# Patient Record
Sex: MALE | Race: WHITE | NOT HISPANIC OR LATINO | Employment: OTHER | URBAN - METROPOLITAN AREA
[De-identification: names, ages, dates, MRNs, and addresses within clinical notes are randomized per-mention and may not be internally consistent; named-entity substitution may affect disease eponyms.]

---

## 2021-07-11 ENCOUNTER — APPOINTMENT (OUTPATIENT)
Dept: RADIOLOGY | Facility: MEDICAL CENTER | Age: 66
DRG: 872 | End: 2021-07-11
Attending: STUDENT IN AN ORGANIZED HEALTH CARE EDUCATION/TRAINING PROGRAM
Payer: MEDICARE

## 2021-07-11 ENCOUNTER — HOSPITAL ENCOUNTER (INPATIENT)
Facility: MEDICAL CENTER | Age: 66
LOS: 2 days | DRG: 872 | End: 2021-07-13
Attending: INTERNAL MEDICINE | Admitting: STUDENT IN AN ORGANIZED HEALTH CARE EDUCATION/TRAINING PROGRAM
Payer: MEDICARE

## 2021-07-11 PROBLEM — K40.90 UNILATERAL INGUINAL HERNIA: Status: ACTIVE | Noted: 2021-07-11

## 2021-07-11 PROBLEM — G35 MULTIPLE SCLEROSIS (HCC): Status: ACTIVE | Noted: 2021-07-11

## 2021-07-11 PROBLEM — A41.9 SEPSIS SECONDARY TO UTI (HCC): Status: ACTIVE | Noted: 2021-07-11

## 2021-07-11 PROBLEM — N39.0 SEPSIS SECONDARY TO UTI (HCC): Status: ACTIVE | Noted: 2021-07-11

## 2021-07-11 PROBLEM — F17.200 TOBACCO DEPENDENCE: Status: ACTIVE | Noted: 2021-07-11

## 2021-07-11 LAB
ANION GAP SERPL CALC-SCNC: 12 MMOL/L (ref 7–16)
APPEARANCE UR: CLEAR
BACTERIA #/AREA URNS HPF: NEGATIVE /HPF
BASOPHILS # BLD AUTO: 0.4 % (ref 0–1.8)
BASOPHILS # BLD: 0.07 K/UL (ref 0–0.12)
BILIRUB UR QL STRIP.AUTO: NEGATIVE
BUN SERPL-MCNC: 11 MG/DL (ref 8–22)
CALCIUM SERPL-MCNC: 8.9 MG/DL (ref 8.5–10.5)
CHLORIDE SERPL-SCNC: 99 MMOL/L (ref 96–112)
CO2 SERPL-SCNC: 23 MMOL/L (ref 20–33)
COLOR UR: YELLOW
CREAT SERPL-MCNC: 0.61 MG/DL (ref 0.5–1.4)
EOSINOPHIL # BLD AUTO: 0.14 K/UL (ref 0–0.51)
EOSINOPHIL NFR BLD: 0.7 % (ref 0–6.9)
EPI CELLS #/AREA URNS HPF: NEGATIVE /HPF
ERYTHROCYTE [DISTWIDTH] IN BLOOD BY AUTOMATED COUNT: 45.6 FL (ref 35.9–50)
GLUCOSE SERPL-MCNC: 101 MG/DL (ref 65–99)
GLUCOSE UR STRIP.AUTO-MCNC: NEGATIVE MG/DL
HCT VFR BLD AUTO: 35.1 % (ref 42–52)
HGB BLD-MCNC: 11.8 G/DL (ref 14–18)
HYALINE CASTS #/AREA URNS LPF: ABNORMAL /LPF
IMM GRANULOCYTES # BLD AUTO: 0.12 K/UL (ref 0–0.11)
IMM GRANULOCYTES NFR BLD AUTO: 0.6 % (ref 0–0.9)
KETONES UR STRIP.AUTO-MCNC: NEGATIVE MG/DL
LEUKOCYTE ESTERASE UR QL STRIP.AUTO: ABNORMAL
LYMPHOCYTES # BLD AUTO: 1.53 K/UL (ref 1–4.8)
LYMPHOCYTES NFR BLD: 8.2 % (ref 22–41)
MCH RBC QN AUTO: 32.2 PG (ref 27–33)
MCHC RBC AUTO-ENTMCNC: 33.6 G/DL (ref 33.7–35.3)
MCV RBC AUTO: 95.6 FL (ref 81.4–97.8)
MICRO URNS: ABNORMAL
MONOCYTES # BLD AUTO: 1.04 K/UL (ref 0–0.85)
MONOCYTES NFR BLD AUTO: 5.6 % (ref 0–13.4)
NEUTROPHILS # BLD AUTO: 15.82 K/UL (ref 1.82–7.42)
NEUTROPHILS NFR BLD: 84.5 % (ref 44–72)
NITRITE UR QL STRIP.AUTO: NEGATIVE
NRBC # BLD AUTO: 0 K/UL
NRBC BLD-RTO: 0 /100 WBC
PH UR STRIP.AUTO: 8 [PH] (ref 5–8)
PLATELET # BLD AUTO: 641 K/UL (ref 164–446)
PMV BLD AUTO: 9.4 FL (ref 9–12.9)
POTASSIUM SERPL-SCNC: 3.7 MMOL/L (ref 3.6–5.5)
PROT UR QL STRIP: NEGATIVE MG/DL
RBC # BLD AUTO: 3.67 M/UL (ref 4.7–6.1)
RBC # URNS HPF: ABNORMAL /HPF
RBC UR QL AUTO: ABNORMAL
SARS-COV+SARS-COV-2 AG RESP QL IA.RAPID: NOTDETECTED
SODIUM SERPL-SCNC: 134 MMOL/L (ref 135–145)
SP GR UR STRIP.AUTO: 1.01
SPECIMEN SOURCE: NORMAL
UROBILINOGEN UR STRIP.AUTO-MCNC: 0.2 MG/DL
WBC # BLD AUTO: 18.7 K/UL (ref 4.8–10.8)
WBC #/AREA URNS HPF: ABNORMAL /HPF

## 2021-07-11 PROCEDURE — 81001 URINALYSIS AUTO W/SCOPE: CPT

## 2021-07-11 PROCEDURE — 36415 COLL VENOUS BLD VENIPUNCTURE: CPT

## 2021-07-11 PROCEDURE — A9270 NON-COVERED ITEM OR SERVICE: HCPCS | Performed by: INTERNAL MEDICINE

## 2021-07-11 PROCEDURE — 700111 HCHG RX REV CODE 636 W/ 250 OVERRIDE (IP): Performed by: STUDENT IN AN ORGANIZED HEALTH CARE EDUCATION/TRAINING PROGRAM

## 2021-07-11 PROCEDURE — 80048 BASIC METABOLIC PNL TOTAL CA: CPT

## 2021-07-11 PROCEDURE — 87426 SARSCOV CORONAVIRUS AG IA: CPT

## 2021-07-11 PROCEDURE — 700102 HCHG RX REV CODE 250 W/ 637 OVERRIDE(OP): Performed by: STUDENT IN AN ORGANIZED HEALTH CARE EDUCATION/TRAINING PROGRAM

## 2021-07-11 PROCEDURE — 99221 1ST HOSP IP/OBS SF/LOW 40: CPT | Mod: AI | Performed by: STUDENT IN AN ORGANIZED HEALTH CARE EDUCATION/TRAINING PROGRAM

## 2021-07-11 PROCEDURE — 700105 HCHG RX REV CODE 258: Performed by: STUDENT IN AN ORGANIZED HEALTH CARE EDUCATION/TRAINING PROGRAM

## 2021-07-11 PROCEDURE — 85025 COMPLETE CBC W/AUTO DIFF WBC: CPT

## 2021-07-11 PROCEDURE — 74177 CT ABD & PELVIS W/CONTRAST: CPT

## 2021-07-11 PROCEDURE — A9270 NON-COVERED ITEM OR SERVICE: HCPCS | Performed by: STUDENT IN AN ORGANIZED HEALTH CARE EDUCATION/TRAINING PROGRAM

## 2021-07-11 PROCEDURE — 700117 HCHG RX CONTRAST REV CODE 255: Performed by: STUDENT IN AN ORGANIZED HEALTH CARE EDUCATION/TRAINING PROGRAM

## 2021-07-11 PROCEDURE — 700102 HCHG RX REV CODE 250 W/ 637 OVERRIDE(OP): Performed by: INTERNAL MEDICINE

## 2021-07-11 PROCEDURE — 770006 HCHG ROOM/CARE - MED/SURG/GYN SEMI*

## 2021-07-11 PROCEDURE — 87040 BLOOD CULTURE FOR BACTERIA: CPT

## 2021-07-11 RX ORDER — SODIUM CHLORIDE 9 MG/ML
1000 INJECTION, SOLUTION INTRAVENOUS ONCE
Status: COMPLETED | OUTPATIENT
Start: 2021-07-11 | End: 2021-07-11

## 2021-07-11 RX ORDER — BACLOFEN 10 MG/1
20 TABLET ORAL 3 TIMES DAILY PRN
Status: DISCONTINUED | OUTPATIENT
Start: 2021-07-11 | End: 2021-07-11

## 2021-07-11 RX ORDER — NICOTINE 21 MG/24HR
14 PATCH, TRANSDERMAL 24 HOURS TRANSDERMAL
Status: DISCONTINUED | OUTPATIENT
Start: 2021-07-11 | End: 2021-07-13 | Stop reason: HOSPADM

## 2021-07-11 RX ORDER — HYDROMORPHONE HYDROCHLORIDE 1 MG/ML
0.25 INJECTION, SOLUTION INTRAMUSCULAR; INTRAVENOUS; SUBCUTANEOUS
Status: DISCONTINUED | OUTPATIENT
Start: 2021-07-11 | End: 2021-07-12

## 2021-07-11 RX ORDER — BACLOFEN 10 MG/1
20 TABLET ORAL 3 TIMES DAILY PRN
COMMUNITY

## 2021-07-11 RX ORDER — POTASSIUM CHLORIDE 20 MEQ/1
40 TABLET, EXTENDED RELEASE ORAL ONCE
Status: COMPLETED | OUTPATIENT
Start: 2021-07-11 | End: 2021-07-11

## 2021-07-11 RX ORDER — POLYETHYLENE GLYCOL 3350 17 G/17G
1 POWDER, FOR SOLUTION ORAL
Status: CANCELLED | OUTPATIENT
Start: 2021-07-11

## 2021-07-11 RX ORDER — GABAPENTIN 400 MG/1
800 CAPSULE ORAL 3 TIMES DAILY PRN
Status: DISCONTINUED | OUTPATIENT
Start: 2021-07-11 | End: 2021-07-13 | Stop reason: HOSPADM

## 2021-07-11 RX ORDER — OXYCODONE HYDROCHLORIDE 5 MG/1
2.5 TABLET ORAL
Status: DISCONTINUED | OUTPATIENT
Start: 2021-07-11 | End: 2021-07-12

## 2021-07-11 RX ORDER — GABAPENTIN 400 MG/1
800 CAPSULE ORAL 3 TIMES DAILY PRN
COMMUNITY

## 2021-07-11 RX ORDER — GABAPENTIN 400 MG/1
800 CAPSULE ORAL 3 TIMES DAILY PRN
Status: DISCONTINUED | OUTPATIENT
Start: 2021-07-11 | End: 2021-07-11

## 2021-07-11 RX ORDER — BACLOFEN 10 MG/1
20 TABLET ORAL 3 TIMES DAILY PRN
Status: DISCONTINUED | OUTPATIENT
Start: 2021-07-11 | End: 2021-07-13 | Stop reason: HOSPADM

## 2021-07-11 RX ORDER — AMOXICILLIN 250 MG
1 CAPSULE ORAL EVERY EVENING
Status: DISCONTINUED | OUTPATIENT
Start: 2021-07-11 | End: 2021-07-13 | Stop reason: HOSPADM

## 2021-07-11 RX ORDER — ACETAMINOPHEN 325 MG/1
650 TABLET ORAL EVERY 6 HOURS PRN
Status: DISCONTINUED | OUTPATIENT
Start: 2021-07-11 | End: 2021-07-13 | Stop reason: HOSPADM

## 2021-07-11 RX ORDER — AMOXICILLIN 250 MG
1 CAPSULE ORAL DAILY
Status: DISCONTINUED | OUTPATIENT
Start: 2021-07-12 | End: 2021-07-11 | Stop reason: CLARIF

## 2021-07-11 RX ORDER — AMOXICILLIN 250 MG
2 CAPSULE ORAL 2 TIMES DAILY
Status: CANCELLED | OUTPATIENT
Start: 2021-07-11

## 2021-07-11 RX ORDER — OXYCODONE HYDROCHLORIDE 5 MG/1
5 TABLET ORAL
Status: DISCONTINUED | OUTPATIENT
Start: 2021-07-11 | End: 2021-07-12

## 2021-07-11 RX ORDER — BISACODYL 10 MG
10 SUPPOSITORY, RECTAL RECTAL
Status: CANCELLED | OUTPATIENT
Start: 2021-07-11

## 2021-07-11 RX ADMIN — PIPERACILLIN AND TAZOBACTAM 3.38 G: 3; .375 INJECTION, POWDER, LYOPHILIZED, FOR SOLUTION INTRAVENOUS; PARENTERAL at 23:29

## 2021-07-11 RX ADMIN — SODIUM CHLORIDE 1000 ML: 9 INJECTION, SOLUTION INTRAVENOUS at 04:24

## 2021-07-11 RX ADMIN — IOHEXOL 100 ML: 350 INJECTION, SOLUTION INTRAVENOUS at 15:26

## 2021-07-11 RX ADMIN — DOCUSATE SODIUM 50 MG AND SENNOSIDES 8.6 MG 1 TABLET: 8.6; 5 TABLET, FILM COATED ORAL at 20:22

## 2021-07-11 RX ADMIN — PIPERACILLIN AND TAZOBACTAM 3.38 G: 3; .375 INJECTION, POWDER, LYOPHILIZED, FOR SOLUTION INTRAVENOUS; PARENTERAL at 06:49

## 2021-07-11 RX ADMIN — OXYCODONE 2.5 MG: 5 TABLET ORAL at 16:11

## 2021-07-11 RX ADMIN — PIPERACILLIN AND TAZOBACTAM 3.38 G: 3; .375 INJECTION, POWDER, LYOPHILIZED, FOR SOLUTION INTRAVENOUS; PARENTERAL at 15:56

## 2021-07-11 RX ADMIN — OXYCODONE 2.5 MG: 5 TABLET ORAL at 23:29

## 2021-07-11 RX ADMIN — POTASSIUM CHLORIDE 40 MEQ: 1500 TABLET, EXTENDED RELEASE ORAL at 10:24

## 2021-07-11 RX ADMIN — PIPERACILLIN AND TAZOBACTAM 3.38 G: 3; .375 INJECTION, POWDER, LYOPHILIZED, FOR SOLUTION INTRAVENOUS; PARENTERAL at 05:51

## 2021-07-11 ASSESSMENT — COGNITIVE AND FUNCTIONAL STATUS - GENERAL
MOVING FROM LYING ON BACK TO SITTING ON SIDE OF FLAT BED: A LITTLE
MOBILITY SCORE: 19
HELP NEEDED FOR BATHING: A LITTLE
STANDING UP FROM CHAIR USING ARMS: A LITTLE
WALKING IN HOSPITAL ROOM: A LITTLE
DAILY ACTIVITIY SCORE: 22
MOVING TO AND FROM BED TO CHAIR: A LITTLE
SUGGESTED CMS G CODE MODIFIER DAILY ACTIVITY: CJ
SUGGESTED CMS G CODE MODIFIER MOBILITY: CK
CLIMB 3 TO 5 STEPS WITH RAILING: A LITTLE
TOILETING: A LITTLE

## 2021-07-11 ASSESSMENT — LIFESTYLE VARIABLES
HAVE PEOPLE ANNOYED YOU BY CRITICIZING YOUR DRINKING: NO
EVER HAD A DRINK FIRST THING IN THE MORNING TO STEADY YOUR NERVES TO GET RID OF A HANGOVER: NO
AVERAGE NUMBER OF DAYS PER WEEK YOU HAVE A DRINK CONTAINING ALCOHOL: 2
ON A TYPICAL DAY WHEN YOU DRINK ALCOHOL HOW MANY DRINKS DO YOU HAVE: 2
TOTAL SCORE: 0
DOES PATIENT WANT TO STOP DRINKING: NO
TOTAL SCORE: 0
EVER FELT BAD OR GUILTY ABOUT YOUR DRINKING: NO
CONSUMPTION TOTAL: NEGATIVE
HOW MANY TIMES IN THE PAST YEAR HAVE YOU HAD 5 OR MORE DRINKS IN A DAY: 0
ALCOHOL_USE: YES
TOTAL SCORE: 0
HAVE YOU EVER FELT YOU SHOULD CUT DOWN ON YOUR DRINKING: NO

## 2021-07-11 ASSESSMENT — ENCOUNTER SYMPTOMS
DIZZINESS: 0
BLURRED VISION: 0
DEPRESSION: 0
HEADACHES: 0
MYALGIAS: 0
FEVER: 1
HEMOPTYSIS: 0
CHILLS: 1
COUGH: 0
BRUISES/BLEEDS EASILY: 0
HEARTBURN: 0
NAUSEA: 0
NECK PAIN: 0
DOUBLE VISION: 0
PALPITATIONS: 0

## 2021-07-11 ASSESSMENT — PAIN DESCRIPTION - PAIN TYPE
TYPE: ACUTE PAIN

## 2021-07-11 ASSESSMENT — PATIENT HEALTH QUESTIONNAIRE - PHQ9
1. LITTLE INTEREST OR PLEASURE IN DOING THINGS: NOT AT ALL
2. FEELING DOWN, DEPRESSED, IRRITABLE, OR HOPELESS: NOT AT ALL
SUM OF ALL RESPONSES TO PHQ9 QUESTIONS 1 AND 2: 0

## 2021-07-11 NOTE — PROGRESS NOTES
"Patient was admitted for sepsis due to UTI & also for incarcerated left inguinal hernia.  Patient was being transferred from [facility].  Urine culture at the other facility growing E. Coli.   General surgery consulted. Per surgery , \" Patient liane be scheduled for elective robotic repair once leukocytosis improves and urinary infection has resolved\".   Diet   IV Zosyn   Repeat UA.   K replete   Pain control  CT A/P - pending     "

## 2021-07-11 NOTE — PROGRESS NOTES
"Received report from [facility] RN; assumed care once patient arrived via mediflight team at approximately 0300. Admissions/admitting hospitalist team contacted. [facility]. Patient able to be awoken partially by staff, then fell back to sleep mid-sentence upon arrival. A&O 1-2 when arrived/3-4 by end of shift. Patient states feels \"shanghi'd\"; remembers the pain, then next remembers being on a helicopter. VSS, mild bradycardia noted. 97% on RA. Patient denied SOB, numbness, tingling, nausea, vomiting. Patient has MS, states normally walks around the shop and takes breaks when tired. Utilizing FWW per transferring RN. Patient assisted pivot transfer to chair this AM d/t incontinence x 3 times during shift; 3 briefs changed with 1 linen change. Patient states doesn't normally happen. Abdomen round. Hyperactive BS x 1 RLQ. Pain reported to back/groin/RLE; back surgery discussions in progress. Repositioning effective. + void; UA sent to lab. + eructation. LBM PTA. 2 RN skin check performed with oncoming RN. POC discussed. Questions answered. HFR; bed alarm on/engaged. Call light/personal belongings within reach. Patient sleeping at present time.   "

## 2021-07-11 NOTE — CARE PLAN
The patient is a watcher.     Shift Goals  Clinical Goals: Covid swab, reorient patient, sleep  Patient Goals: Sleep    Progress made toward(s) clinical / shift goals: Covid swab sent to lab after educating patient. Patient oriented by end of shift. Patient slept intermittent after arrival.     Patient is not progressing towards the following goals: NA.

## 2021-07-11 NOTE — ASSESSMENT & PLAN NOTE
At Outside facility patient had left sided inguinal hernia that was irreducible transferred to [facility] for higher level of care and general surgery consultation. Dr. Gentile was consulted    Surgery recommended hernia repair outpatient, Follow up with Dr. Barrera in 1-2 weeks. Surgery signing off

## 2021-07-11 NOTE — ASSESSMENT & PLAN NOTE
This is Sepsis Present on admission  SIRS criteria identified on my evaluation include: Tachycardia, with heart rate greater than 90 BPM and Leukocytosis, with WBC greater than 12,000  Source is E. Coli urinary tract infection   Sepsis protocol initiated  Fluid resuscitation ordered per protocol  IV antibiotics as appropriate for source of sepsis  While organ dysfunction may be noted elsewhere in this problem list or in the chart, degree of organ dysfunction does not meet CMS criteria for severe sepsis    UCx at OSH growing E. Coli   On IV Zosyn   Pt got 3 days of IV ABx at [facility] & 2 days of IV zosyn here, Can d/c ABx in AM or switched to Oral upon discharge.

## 2021-07-11 NOTE — PROGRESS NOTES
[facility]  Transferring facility: [facility]    Chief complaint: Abdominal pain  Pertinent history & patient course:   66-year-old male was admitted for sepsis due to UTI 2 days ago at the other facility, who is somewhat improving however he still having leukocytosis and noticed incarcerated hernia, patient will be transferred to our facility for higher level of care and general surgery consult, the case was discussed with surgeon Dr Gentile before transfer who agreed to accept the patient.  Patient has been received ceftriaxone.  According to the physician at the other facility patient stable for transfer no need for ICU    Pertinent imaging & lab results: Elevated white blood cell more than 15  Further work up or recommendations per triage officer prior to transfer: None general surgeon  Consultants called prior to transfer and pertinent input from consultants: Dr. Gentile agreed to consult.   Patient accepted for transfer: Yes  Consultants to be called upon arrival: General surgeon Dr. Gentile  Admission status: Inpatient.   Floor requested: Surgical floor  If ICU transfer, name of intensivist case discussed with and pertinent input from critical care: None    Please inform the triage officer upon arrival of the patient to [facility] for assignment of a hospitalist to perform admission.     For any question or concerns regarding the care of this patient, please reach out to the assigned hospitalist.

## 2021-07-11 NOTE — H&P
Hospital Medicine History & Physical Note    Date of Service  7/11/2021    Primary Care Physician  No primary care provider on file.    Consultants  general surgery    Specialist Names: Dr. Gentile    Code Status  Full Code    Chief Complaint  Sepsis secondary to UTI  Left inguinal hernia      History of Presenting Illness  Chidi Ibrahim is a 66 y.o. male with past medical history of multiple sclerosis who presented 7/11/2021 as a transfer from [place] with left inguinal hernia.  They performed an ultrasound earlier in the admission which showed left-sided inguinal hernia requiring multiple reductions however last reduction was unsuccessful so patient was transferred here for general surgery consult.  Original urine culture was positive for E. coli sensitive to Rocephin.  Patient was initially admitted with sepsis secondary to urinary tract infection on July 8, 2021.  He reports associated fever, chills, nausea and generalized body aches.         I discussed the plan of care with patient.    Review of Systems  Review of Systems   Constitutional: Positive for chills and fever.   HENT: Negative for hearing loss and tinnitus.    Eyes: Negative for blurred vision and double vision.   Respiratory: Negative for cough and hemoptysis.    Cardiovascular: Negative for chest pain and palpitations.   Gastrointestinal: Negative for heartburn and nausea.   Genitourinary: Positive for frequency and hematuria. Negative for dysuria and urgency.   Musculoskeletal: Negative for myalgias and neck pain.   Skin: Negative for itching and rash.   Neurological: Negative for dizziness and headaches.   Endo/Heme/Allergies: Does not bruise/bleed easily.   Psychiatric/Behavioral: Negative for depression and suicidal ideas.       Past Medical History   has a past medical history of Multiple sclerosis (HCC).    Surgical History   has a past surgical history that includes other. - L4-L5 spinal fusion     Family History    Family history reviewed with  patient. There is no family history that is pertinent to the chief complaint.     Social History       Allergies  No Known Allergies    Medications  None       Physical Exam  Temp:  [36.1 °C (96.9 °F)] 36.1 °C (96.9 °F)  Pulse:  [59] 59  Resp:  [19] 19  BP: (122)/(76) 122/76  SpO2:  [97 %] 97 %    Physical Exam  Constitutional:       Appearance: Normal appearance.   HENT:      Head: Normocephalic and atraumatic.      Right Ear: Tympanic membrane normal.      Left Ear: Tympanic membrane normal.      Nose: Nose normal.      Mouth/Throat:      Mouth: Mucous membranes are dry.   Eyes:      Extraocular Movements: Extraocular movements intact.      Pupils: Pupils are equal, round, and reactive to light.   Cardiovascular:      Rate and Rhythm: Normal rate and regular rhythm.      Pulses: Normal pulses.      Heart sounds: Normal heart sounds.   Pulmonary:      Effort: Pulmonary effort is normal. No respiratory distress.      Breath sounds: Normal breath sounds. No stridor. No wheezing or rhonchi.   Abdominal:      General: Bowel sounds are normal. There is no distension.      Palpations: Abdomen is soft. There is no mass.      Tenderness: There is no abdominal tenderness.      Hernia: No hernia is present.   Genitourinary:     Comments: Left inguinal hernia non reducible   Musculoskeletal:         General: No swelling or tenderness. Normal range of motion.      Cervical back: Neck supple.   Skin:     General: Skin is dry.      Capillary Refill: Capillary refill takes less than 2 seconds.      Coloration: Skin is not jaundiced or pale.      Findings: No bruising or erythema.   Neurological:      General: No focal deficit present.      Mental Status: He is alert and oriented to person, place, and time.      Cranial Nerves: No cranial nerve deficit.      Sensory: No sensory deficit.      Motor: No weakness.      Coordination: Coordination normal.   Psychiatric:         Mood and Affect: Mood normal.         Behavior: Behavior  normal.         Laboratory:    Labs from outside hospital  7/10/2021   WBC 23.5  Hemoglobin 12.5  Hematocrit 36.4  Platelets 655  Glucose 96  BUN 11  Creatinine 1  Sodium 136  Potassium 3.6  Chloride 100  , Dioxide 28  Anion gap 8  Calcium 8.9       Imaging:  No orders to display       Scrotal ultrasound 7/9/201 from outside facility  Normal appearance of the testicles.  Small left hydrocele.  Small left epididymal cyst  Left inguinal hernia.    UA  Color yellow  Appearance turbid  pH 6  Specific gravity 1.020  Blood large  Leukocyte esterase large  Nitrate positive  WBC greater than 100/hpf  RBC 10-25/hpf  Epithelial none seen  Bacteria many    no X-Ray or EKG requiring interpretation    Assessment/Plan:  I anticipate this patient will require at least two midnights for appropriate medical management, necessitating inpatient admission.    * Unilateral inguinal hernia  Assessment & Plan  At Outside facility patient had left sided inguinal hernia that was irreducible transferred to [facility] for higher level of care and general surgery consultation. Dr. Gentile was consulted who will evalute patient in am.   Continue with zosyn   Pain control   NPO       Sepsis secondary to UTI ([facility])  Assessment & Plan  This is Sepsis Present on admission  SIRS criteria identified on my evaluation include: Tachycardia, with heart rate greater than 90 BPM and Leukocytosis, with WBC greater than 12,000  Source is E. Coli urinary tract infection   Sepsis protocol initiated  Fluid resuscitation ordered per protocol  IV antibiotics as appropriate for source of sepsis  While organ dysfunction may be noted elsewhere in this problem list or in the chart, degree of organ dysfunction does not meet CMS criteria for severe sepsis    IVF  UCx at OSH growing E. Coli   Continue with Zosyn   Follow blood cultures       Tobacco dependence  Assessment & Plan  Active smoker   Nicotine patch protocol     Multiple sclerosis (HCC)  Assessment & Plan  Stable        VTE prophylaxis: enoxaparin ppx

## 2021-07-11 NOTE — PROGRESS NOTES
2 RN skin check complete.     Devices in place: Brief.   Skin assessed under devices: above.     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 Scattered abrasions BUE/BLE. Mild scrotal swelling. Red, blanchable sacrum. Dry feet. Hyperkeratinized toenails.     Confirmed pressure ulcers found on: NA.   New potential pressure ulcers noted on: NA.   Wound consult placed: NA.     The following interventions in place: Pillows for limb positioning.

## 2021-07-11 NOTE — CARE PLAN
Problem: Urinary - Renal Perfusion  Goal: Ability to achieve and maintain adequate renal perfusion and functioning will improve  Outcome: Progressing  Note: Pt on IV abx for UTI. Night shift RN states she sent a UA this a.m. Results pending. Pt voiding clear, yellow urine. Pt denies burning.      Problem: Fall Risk  Goal: Patient will remain free from falls  Outcome: Progressing     Problem: Knowledge Deficit - Standard  Goal: Patient and family/care givers will demonstrate understanding of plan of care, disease process/condition, diagnostic tests and medications  Outcome: Met  Note: Plan of care discussed with patient. All questions answered. Plan for today CT scan, ambulation, pt/ot eval, pain control, IV abx.      The patient is Stable - Low risk of patient condition declining or worsening    Shift Goals  Clinical Goals: Covid swab, reorient patient, sleep  Patient Goals: Sleep    Progress made toward(s) clinical / shift goals:      Patient is not progressing towards the following goals:

## 2021-07-11 NOTE — CONSULTS
General Surgery Consult    CHIEF COMPLAINT: left inguinal hernia    HISTORY OF PRESENT ILLNESS: The patient is a 66 y.o. male, who was transferred from [place] last evening for incarcerated left inguinal hernia. Patient ws admitted there for urosepsis (E. Coli) and noted to have a reducible left inguinal hernia. Yesterday this left inguinal hernia became unreducible and because there ws no general surgery coverage at his hospital, he was transferred here for surgical evaluation. Patient reports very little discomfort in his groin this am. States that he thinks it went back in by itself while he was traveling here. He reports no abdominal pain, no nausea, no vomiting, no obstipation or constipation.     PAST MEDICAL HISTORY:  has a past medical history of Multiple sclerosis (HCC).     PAST SURGICAL HISTORY: lumbar fusion     ALLERGIES: No Known Allergies     CURRENT MEDICATIONS:   Home Medications    **Home medications have not yet been reviewed for this encounter**         FAMILY HISTORY: No family history on file.     SOCIAL HISTORY:   Social History     Tobacco Use   • Smoking status: Current Every Day Smoker     Types: Cigarettes   Substance and Sexual Activity   • Alcohol use: Not on file   • Drug use: Not on file   • Sexual activity: Not on file       REVIEW OF SYSTEMS: Comprehensive review of systems was negative aside from urinary frequency/dysuria, hematuria    PHYSICAL EXAMINATION:     GENERAL: The patient is awake and alert, appears well and comfortable.   VITAL SIGNS: /76   Pulse (!) 59   Temp 36.1 °C (96.9 °F) (Temporal)   Resp 19   Ht 1.829 m (6')   Wt 65.4 kg (144 lb 2.9 oz)   SpO2 97%   HEAD AND NECK: Demonstrates symmetric, reactive pupils. Extraocular muscles   are intact. Nares and oropharynx are clear.   NECK: Supple. No adenopathy.  CHEST:No respiratory distress.    CARDIOVASCULAR: Regular rate. The extremities are well perfused.   ABDOMEN: soft, NT, ND, easily reducible left inguinal  hernia is mildly tender to manipulate but full reduces back into abdomen.   EXTREMITIES: Examination of the upper and lower extremities demonstrates no cyanosis edema or clubbing.  NEUROLOGIC: Alert & oriented x 3, Normal motor function, Normal sensory function, No focal deficits noted.    LABORATORY VALUES:   Recent Labs     07/11/21  0517   WBC 18.7*   RBC 3.67*   HEMOGLOBIN 11.8*   HEMATOCRIT 35.1*   MCV 95.6   MCH 32.2   MCHC 33.6*   RDW 45.6   PLATELETCT 641*   MPV 9.4     Recent Labs     07/11/21  0517   SODIUM 134*   POTASSIUM 3.7   CHLORIDE 99   CO2 23   GLUCOSE 101*   BUN 11   CREATININE 0.61   CALCIUM 8.9                IMAGING:   CT-ABDOMEN-PELVIS WITH    (Results Pending)       IMPRESSION AND PLAN:   67 y/o male with E. Coli urosepsis, WBC still 19 today. Patient has easily reducible left inguinal hernia and no significant GI symptoms or other complaints, but elective mesh repair should be delayed in the setting of ongoing E. Coli urosepsis. Patient liane be scheduled for elective robotic repair once leukocytosis improves and urinary infection has resolved  1. Appreciate medical care  2. abx per culture  3. dvt prophyaxis  4. Diet as tolerated  5. Will follow with you      ___________________________________   Dwight Barrera M.D.    DD: 7/11/2021 DT: 7:24 AM

## 2021-07-12 LAB
ALBUMIN SERPL BCP-MCNC: 3.9 G/DL (ref 3.2–4.9)
ALBUMIN/GLOB SERPL: 1.1 G/DL
ALP SERPL-CCNC: 119 U/L (ref 30–99)
ALT SERPL-CCNC: 14 U/L (ref 2–50)
ANION GAP SERPL CALC-SCNC: 10 MMOL/L (ref 7–16)
AST SERPL-CCNC: 12 U/L (ref 12–45)
BASOPHILS # BLD AUTO: 0.8 % (ref 0–1.8)
BASOPHILS # BLD AUTO: 1.2 % (ref 0–1.8)
BASOPHILS # BLD: 0.1 K/UL (ref 0–0.12)
BASOPHILS # BLD: 0.14 K/UL (ref 0–0.12)
BILIRUB SERPL-MCNC: 0.3 MG/DL (ref 0.1–1.5)
BUN SERPL-MCNC: 11 MG/DL (ref 8–22)
CALCIUM SERPL-MCNC: 10 MG/DL (ref 8.5–10.5)
CHLORIDE SERPL-SCNC: 95 MMOL/L (ref 96–112)
CO2 SERPL-SCNC: 28 MMOL/L (ref 20–33)
CREAT SERPL-MCNC: 0.74 MG/DL (ref 0.5–1.4)
EOSINOPHIL # BLD AUTO: 0.1 K/UL (ref 0–0.51)
EOSINOPHIL # BLD AUTO: 0.22 K/UL (ref 0–0.51)
EOSINOPHIL NFR BLD: 0.8 % (ref 0–6.9)
EOSINOPHIL NFR BLD: 1.8 % (ref 0–6.9)
ERYTHROCYTE [DISTWIDTH] IN BLOOD BY AUTOMATED COUNT: 46.4 FL (ref 35.9–50)
ERYTHROCYTE [DISTWIDTH] IN BLOOD BY AUTOMATED COUNT: 46.7 FL (ref 35.9–50)
GLOBULIN SER CALC-MCNC: 3.6 G/DL (ref 1.9–3.5)
GLUCOSE SERPL-MCNC: 93 MG/DL (ref 65–99)
HCT VFR BLD AUTO: 40.2 % (ref 42–52)
HCT VFR BLD AUTO: 40.9 % (ref 42–52)
HGB BLD-MCNC: 13.5 G/DL (ref 14–18)
HGB BLD-MCNC: 13.8 G/DL (ref 14–18)
IMM GRANULOCYTES # BLD AUTO: 0.11 K/UL (ref 0–0.11)
IMM GRANULOCYTES # BLD AUTO: 0.12 K/UL (ref 0–0.11)
IMM GRANULOCYTES NFR BLD AUTO: 0.9 % (ref 0–0.9)
IMM GRANULOCYTES NFR BLD AUTO: 1 % (ref 0–0.9)
LYMPHOCYTES # BLD AUTO: 1.21 K/UL (ref 1–4.8)
LYMPHOCYTES # BLD AUTO: 1.99 K/UL (ref 1–4.8)
LYMPHOCYTES NFR BLD: 16.5 % (ref 22–41)
LYMPHOCYTES NFR BLD: 9.7 % (ref 22–41)
MCH RBC QN AUTO: 32.2 PG (ref 27–33)
MCH RBC QN AUTO: 32.3 PG (ref 27–33)
MCHC RBC AUTO-ENTMCNC: 33.6 G/DL (ref 33.7–35.3)
MCHC RBC AUTO-ENTMCNC: 33.7 G/DL (ref 33.7–35.3)
MCV RBC AUTO: 95.6 FL (ref 81.4–97.8)
MCV RBC AUTO: 96.2 FL (ref 81.4–97.8)
MONOCYTES # BLD AUTO: 0.91 K/UL (ref 0–0.85)
MONOCYTES # BLD AUTO: 1.06 K/UL (ref 0–0.85)
MONOCYTES NFR BLD AUTO: 7.3 % (ref 0–13.4)
MONOCYTES NFR BLD AUTO: 8.8 % (ref 0–13.4)
NEUTROPHILS # BLD AUTO: 10.09 K/UL (ref 1.82–7.42)
NEUTROPHILS # BLD AUTO: 8.51 K/UL (ref 1.82–7.42)
NEUTROPHILS NFR BLD: 70.7 % (ref 44–72)
NEUTROPHILS NFR BLD: 80.5 % (ref 44–72)
NRBC # BLD AUTO: 0 K/UL
NRBC # BLD AUTO: 0 K/UL
NRBC BLD-RTO: 0 /100 WBC
NRBC BLD-RTO: 0 /100 WBC
PLATELET # BLD AUTO: 689 K/UL (ref 164–446)
PLATELET # BLD AUTO: 748 K/UL (ref 164–446)
PMV BLD AUTO: 8.5 FL (ref 9–12.9)
PMV BLD AUTO: 9.1 FL (ref 9–12.9)
POTASSIUM SERPL-SCNC: 4.8 MMOL/L (ref 3.6–5.5)
PROT SERPL-MCNC: 7.5 G/DL (ref 6–8.2)
RBC # BLD AUTO: 4.18 M/UL (ref 4.7–6.1)
RBC # BLD AUTO: 4.28 M/UL (ref 4.7–6.1)
SODIUM SERPL-SCNC: 133 MMOL/L (ref 135–145)
WBC # BLD AUTO: 12 K/UL (ref 4.8–10.8)
WBC # BLD AUTO: 12.5 K/UL (ref 4.8–10.8)

## 2021-07-12 PROCEDURE — 700102 HCHG RX REV CODE 250 W/ 637 OVERRIDE(OP): Performed by: INTERNAL MEDICINE

## 2021-07-12 PROCEDURE — 700111 HCHG RX REV CODE 636 W/ 250 OVERRIDE (IP): Performed by: STUDENT IN AN ORGANIZED HEALTH CARE EDUCATION/TRAINING PROGRAM

## 2021-07-12 PROCEDURE — A9270 NON-COVERED ITEM OR SERVICE: HCPCS | Performed by: INTERNAL MEDICINE

## 2021-07-12 PROCEDURE — 36415 COLL VENOUS BLD VENIPUNCTURE: CPT

## 2021-07-12 PROCEDURE — 80053 COMPREHEN METABOLIC PANEL: CPT

## 2021-07-12 PROCEDURE — 99231 SBSQ HOSP IP/OBS SF/LOW 25: CPT | Performed by: STUDENT IN AN ORGANIZED HEALTH CARE EDUCATION/TRAINING PROGRAM

## 2021-07-12 PROCEDURE — 85025 COMPLETE CBC W/AUTO DIFF WBC: CPT | Mod: 91

## 2021-07-12 PROCEDURE — 97161 PT EVAL LOW COMPLEX 20 MIN: CPT

## 2021-07-12 PROCEDURE — 700105 HCHG RX REV CODE 258: Performed by: STUDENT IN AN ORGANIZED HEALTH CARE EDUCATION/TRAINING PROGRAM

## 2021-07-12 PROCEDURE — 770006 HCHG ROOM/CARE - MED/SURG/GYN SEMI*

## 2021-07-12 RX ORDER — KETOROLAC TROMETHAMINE 10 MG/1
10 TABLET, FILM COATED ORAL EVERY 8 HOURS PRN
Status: DISCONTINUED | OUTPATIENT
Start: 2021-07-12 | End: 2021-07-13 | Stop reason: HOSPADM

## 2021-07-12 RX ADMIN — PIPERACILLIN AND TAZOBACTAM 3.38 G: 3; .375 INJECTION, POWDER, LYOPHILIZED, FOR SOLUTION INTRAVENOUS; PARENTERAL at 09:24

## 2021-07-12 RX ADMIN — DOCUSATE SODIUM 50 MG AND SENNOSIDES 8.6 MG 1 TABLET: 8.6; 5 TABLET, FILM COATED ORAL at 18:21

## 2021-07-12 RX ADMIN — PIPERACILLIN AND TAZOBACTAM 3.38 G: 3; .375 INJECTION, POWDER, LYOPHILIZED, FOR SOLUTION INTRAVENOUS; PARENTERAL at 18:20

## 2021-07-12 ASSESSMENT — GAIT ASSESSMENTS
DEVIATION: TRENDELENBERG
GAIT LEVEL OF ASSIST: SUPERVISED
DISTANCE (FEET): 100
ASSISTIVE DEVICE: FRONT WHEEL WALKER

## 2021-07-12 ASSESSMENT — COGNITIVE AND FUNCTIONAL STATUS - GENERAL
WALKING IN HOSPITAL ROOM: A LITTLE
MOBILITY SCORE: 20
SUGGESTED CMS G CODE MODIFIER MOBILITY: CJ
CLIMB 3 TO 5 STEPS WITH RAILING: A LITTLE
STANDING UP FROM CHAIR USING ARMS: A LITTLE
MOVING TO AND FROM BED TO CHAIR: A LITTLE

## 2021-07-12 ASSESSMENT — PAIN DESCRIPTION - PAIN TYPE
TYPE: ACUTE PAIN
TYPE: ACUTE PAIN

## 2021-07-12 NOTE — PROGRESS NOTES
Report received. Assessment completed. Pt anxious to know POC- updated MD  Pt is A&O x4. Pt on room air.   Denies pain and nausea.  Last BM 7/9. +flatus   +void.  Tolerating diet.   Pt up with SBA and FWW. Verified with PT we do not have a 4WW here for him. Pt has been ambulating with staff and FWW.  Call light and belongings within reach. All needs met at this time. Fall Precautions and hourly rounding in place.

## 2021-07-12 NOTE — PROGRESS NOTES
General Surgery Progress Note  [facility]      CHIEF COMPLAINT: hernia.     HISTORY OF PRESENT ILLNESS: The patient is a 66 y.o. male, who presents with hernia and UTI.     INTERVAL UPDATE: WBC improving, hernia remains reduced    PAST MEDICAL HISTORY:  has a past medical history of Multiple sclerosis (HCC).     PAST SURGICAL HISTORY:  has a past surgical history that includes other.     ALLERGIES: No Known Allergies     CURRENT MEDICATIONS:   Home Medications     Reviewed by Tania Feliciano R.N. (Registered Nurse) on 07/11/21 at 0916  Med List Status: Complete   Medication Last Dose Status   baclofen (LIORESAL) 10 MG Tab  Active   gabapentin (NEURONTIN) 400 MG Cap  Active                FAMILY HISTORY: No family history on file.     SOCIAL HISTORY:   Social History     Tobacco Use   • Smoking status: Current Every Day Smoker     Types: Cigarettes   Substance and Sexual Activity   • Alcohol use: Not on file   • Drug use: Not on file   • Sexual activity: Not on file       REVIEW OF SYSTEMS: Comprehensive review of systems was negative aside from left groin pain    PHYSICAL EXAMINATION:     GENERAL: The patient is in no distress.   VITAL SIGNS: /77   Pulse 72   Temp 36.7 °C (98.1 °F) (Temporal)   Resp 18   Ht 1.829 m (6')   Wt 65.4 kg (144 lb 2.9 oz)   SpO2 98%   HEAD AND NECK: Demonstrates symmetric, reactive pupils. Extraocular muscles   are intact. Nares and oropharynx are clear.   NECK: Supple. No adenopathy.  CHEST:No respiratory distress.    CARDIOVASCULAR: Regular rate. The extremities are well perfused.   ABDOMEN: hernia reduced.   EXTREMITIES: Examination of the upper and lower extremities demonstrates no cyanosis edema or clubbing.  NEUROLOGIC: Alert & oriented x 3, Normal motor function, Normal sensory function, No focal deficits noted    Drain output:   Output by Drain (mL) 07/10/21 0700 - 07/10/21 1859 07/10/21 1900 - 07/11/21 0659 07/11/21 0700 - 07/11/21 1859  07/11/21 1900 - 07/12/21 0659 07/12/21 0700 - 07/12/21 0933   Patient has no LDAs of requested type attached.        LABORATORY VALUES:   Recent Labs     07/11/21  0517 07/12/21  0641   WBC 18.7* 12.5*   RBC 3.67* 4.28*   HEMOGLOBIN 11.8* 13.8*   HEMATOCRIT 35.1* 40.9*   MCV 95.6 95.6   MCH 32.2 32.2   MCHC 33.6* 33.7   RDW 45.6 46.4   PLATELETCT 641* 748*   MPV 9.4 8.5*     Recent Labs     07/11/21  0517 07/12/21  0641   SODIUM 134* 133*   POTASSIUM 3.7 4.8   CHLORIDE 99 95*   CO2 23 28   GLUCOSE 101* 93   BUN 11 11   CREATININE 0.61 0.74   CALCIUM 8.9 10.0     Recent Labs     07/12/21  0641   ASTSGOT 12   ALTSGPT 14   TBILIRUBIN 0.3   ALKPHOSPHAT 119*   GLOBULIN 3.6*            IMAGING:   CT-ABDOMEN-PELVIS WITH   Final Result      1.  Small right inguinal hernia without evidence for obstruction or inflammation      2.  Multiple cyst within the liver and both kidneys      3.  Abdominal aortic atherosclerotic plaque      4.  Nonspecific cyst within the left seminal vesicle          Problem List:   Patient Active Problem List    Diagnosis Date Noted   • Sepsis secondary to UTI (HCC) 07/11/2021   • Multiple sclerosis (HCC) 07/11/2021   • Unilateral inguinal hernia 07/11/2021   • Tobacco dependence 07/11/2021       IMPRESSION AND PLAN:     1.Left inguinal hernia  2. UTI.    1.  Hernia to be fixed as outpatient once UTI resolved  2.  Follow up with Dr. Barrera in 1-2 weeks  3.  Surgery signing off, please call with questions.              ___________________________________   Miguel Mistry M.D.  [facility].  [phone]    DD: 7/12/2021 DT: 9:33 AM

## 2021-07-12 NOTE — CARE PLAN
Problem: Pain - Standard  Goal: Alleviation of pain or a reduction in pain to the patient’s comfort goal  Outcome: Progressing     Problem: Skin Integrity  Goal: Skin integrity is maintained or improved  Outcome: Progressing     Problem: Fall Risk  Goal: Patient will remain free from falls  Outcome: Progressing   The patient is Stable - Low risk of patient condition declining or worsening    Shift Goals  Clinical Goals: Safety, pain control, rest  Patient Goals: Mobility, discharge.     Progress made toward(s) clinical / shift goals:  pt calls appropriately, states pain tolerable at this time    Patient is not progressing towards the following goals:  N/a

## 2021-07-12 NOTE — THERAPY
Physical Therapy   Initial Evaluation     Patient Name: Chidi Ibrahim  Age:  66 y.o., Sex:  male  Medical Record #: [id number]  Today's Date: 7/12/2021     Precautions: Fall Risk (hx of MS/Scoliosis)    Assessment  Patient is 66 y.o. male presenting acutely with L inguinal hernia. Pt also found with sepsis d/t UTI. Pt to be scheduled outpatient for hernia repair once sepsis resolves. Pt with PMH of MS and Scoliosis. He demonstrates weakness primarily in R hip flexor and B knee flexors. Gait impaired by trendelenburg pattern with L trunk listing 2/2 decreased R foot clearance. Pt reports amb slightly below baseline solely d/t using FWW vs his 4WW. No further acute PT needs at this time.    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 Pt works as [employer]. Hx of MS since he was 28.   Prior Level of Functional Mobility   Bed Mobility Independent   Transfer Status Independent   Ambulation Independent   Distance Ambulation (Feet) (Community distances)   Assistive Devices Used 4-Wheel Walker   Active ROM Lower Body    Comments decreased active R hip flexion and B knee flexion 2/2 weakness from MS   Strength Lower Body   Comments grossly 5/5 with exception of R hip flexion 3-/5 and B knee flexors 3-/5   Balance Assessment   Sitting Balance (Static) Good   Sitting Balance (Dynamic) Good   Standing Balance (Static) Fair +   Standing Balance (Dynamic) Fair   Gait Analysis   Gait Level Of Assist Supervised   Assistive Device Front Wheel  Walker   Distance (Feet) 100   Deviation Trendelenberg (L lift to compensate for decreased R foot clearance)   # of Stairs Climbed 0   Weight Bearing Status No restrictions   Comments Distance limited by pt due to required to wear mask in hallway   Bed Mobility    Comments Bed mob not assessed- pt up in chair pre/post assessment   Functional Mobility   Bed, Chair, Wheelchair Transfer Supervised

## 2021-07-12 NOTE — DISCHARGE PLANNING
Anticipated Discharge Disposition: Home    Action: Patient discussed in IDT rounds. Per MD patient pending labs for medical clearance. The patient will likely discharge in the morning.     RN CM spoke with patient at bedside. Patient lives alone in [place], [place]. The patient's sister will be his transportation home tomorrow.     Barriers to Discharge: medical clearance    Plan: Follow up with medical team.

## 2021-07-12 NOTE — PROGRESS NOTES
Received report from AM RN; assumed care. A&O x 4. VSS. 98% on RA. Patient denied SOB, numbness/tingling bilateral feet/RLE baseline. RLE pain increases with exacerbation/medical issues. Patient denied nausea, vomiting. Chronic back pain reported. Medicated x 1; see MAR. Patient stated ineffective. Patient refused alternative dosing/medications. Abdomen round, soft. Normoactive BS x 4 noted. Patient reported improved pain to scrotal area. Patient reported stiffness to RLE, needing to move more to prevent MS from acting up. Pivot transferred to/from bed, around back RN station, and to/from bathroom with FWW. Patient requesting 4 wheel walker for increased mobility. Has one at home. + void, yellow urine noted; small brownish discharge noted in brief, patient removed. + erucation. + flatus. LBM PTA. Patient tolerating IV antibiotics. POC discussed. Questions answered. Bed locked/lowest position. Call light/personal belongings within reach. All needs met throughout shift.

## 2021-07-12 NOTE — DIETARY
Nutrition services: Day 1 of admit.  Chidi Ibrahim is a 66 y.o. male with admitting DX of incarcerated hernia, sepsis.    Consult received for wt loss (2-13 lbs in 1 month), poor PO per admit screen (MST score of 2). Spoke with pt at bedside. Pt was sitting up at edge of bed and appeared adequately nourished. Pt reports a good appetite since admit. Pt stated poor appetite previously d/t not feeling well since the end of June. Pt stated associated 10 lb wt loss. Pt reports a very healthy diet at home, with supplements. RD discussed menu options with pt, modified pt lunch per preference.     Assessment:  Height: 182.9 cm (6')  Weight: 65.4 kg (144 lb 2.9 oz)  Body mass index is 19.55 kg/m²., BMI classification: normal  Diet/Intake: Regular; PO % for most recent meals    Evaluation:   1. Admitted with sepsis d/t UTI. Pt found to have incarcerated left inguinal hernia.  2. History of multiple sclerosis.    Malnutrition Risk: Pt with moderate, acute related malnutrition, 2' sepsis from UTI, as evidenced by <50% of estimated energy needs and 6% wt loss in three weeks.    Recommendations/Plan:  1. Encourage intake of meals.  2. Document intake of all meals as % taken in ADLs to provide interdisciplinary communication across all shifts.   3. Monitor weight.  4. Nutrition rep will continue to see patient for ongoing meal and snack preferences.     RD will continue to monitor per department guidelines.

## 2021-07-12 NOTE — CARE PLAN
The patient is stable.     Shift Goals  Clinical Goals: Safety, pain control, rest  Patient Goals: Mobility, discharge.     Progress made toward(s) clinical / shift goals: Disarmed bed alarm. Patient pivot transferring to/from bed to chair/EOB at beginning of shift. Pain medication ineffective/increased mobility effective. Slept intermittently during shift.     Patient is not progressing towards the following goals: NA.

## 2021-07-12 NOTE — PROGRESS NOTES
"Hospital Medicine Daily Progress Note    Date of Service  7/12/2021    Chief Complaint  Chidi Ibrahim is a 66 y.o. male admitted 7/11/2021 with incarcerated left inguinal hernia.    Hospital Course  Patient was admitted for sepsis due to UTI & also for incarcerated left inguinal hernia.  Patient was being transferred from [facility].  Urine culture at the other facility growing E. Coli.   General surgery consulted. Per surgery , \" Patient liane be scheduled for elective robotic repair once leukocytosis improves and urinary infection has resolved\".   CT A/P - Small right inguinal hernia without evidence for obstruction or inflammation    Interval Problem Update  Patient was seen and examined at bedside. Patient stated that he lives far away, wants to know the discharge plan at tomorrow 8 AM, so that he can plan for the ride home.  On IV Zosyn   Pt got 3 days of IV ABx at [facility] & 2 days of IV zosyn here, Can d/c ABx in AM or switched to Oral upon discharge.   Surgery recommended hernia repair outpatient, Follow up with Dr. Barrera in 1-2 weeks. Surgery signing off    I have personally seen and examined the patient at bedside. I discussed the plan of care with patient and bedside RN.    Consultants/Specialty  general surgery    Code Status  Full Code    Disposition  Patient is not medically cleared.   Anticipate discharge to to home with close outpatient follow-up.  I have placed the appropriate orders for post-discharge needs.    Review of Systems  ROS     Physical Exam  Temp:  [36 °C (96.8 °F)-37.1 °C (98.8 °F)] 37.1 °C (98.8 °F)  Pulse:  [66-79] 79  Resp:  [18] 18  BP: (103-132)/(41-77) 132/41  SpO2:  [95 %-99 %] 99 %    Physical Exam    Fluids    Intake/Output Summary (Last 24 hours) at 7/12/2021 1251  Last data filed at 7/12/2021 0732  Gross per 24 hour   Intake 560 ml   Output 1975 ml   Net -1415 ml       Laboratory  Recent Labs     07/11/21  0517 07/12/21  0641   WBC 18.7* 12.5*   RBC 3.67* 4.28* "   HEMOGLOBIN 11.8* 13.8*   HEMATOCRIT 35.1* 40.9*   MCV 95.6 95.6   MCH 32.2 32.2   MCHC 33.6* 33.7   RDW 45.6 46.4   PLATELETCT 641* 748*   MPV 9.4 8.5*     Recent Labs     07/11/21  0517 07/12/21  0641   SODIUM 134* 133*   POTASSIUM 3.7 4.8   CHLORIDE 99 95*   CO2 23 28   GLUCOSE 101* 93   BUN 11 11   CREATININE 0.61 0.74   CALCIUM 8.9 10.0                   Imaging  CT-ABDOMEN-PELVIS WITH   Final Result      1.  Small right inguinal hernia without evidence for obstruction or inflammation      2.  Multiple cyst within the liver and both kidneys      3.  Abdominal aortic atherosclerotic plaque      4.  Nonspecific cyst within the left seminal vesicle           Assessment/Plan  * Unilateral inguinal hernia  Assessment & Plan  At Outside facility patient had left sided inguinal hernia that was irreducible transferred to [facility] for higher level of care and general surgery consultation. Dr. Gentile was consulted    Surgery recommended hernia repair outpatient, Follow up with Dr. Barrera in 1-2 weeks. Surgery signing off    Tobacco dependence  Assessment & Plan  Active smoker   Nicotine patch protocol     Multiple sclerosis (HCC)  Assessment & Plan  Stable     Sepsis secondary to UTI ([facility])  Assessment & Plan  This is Sepsis Present on admission  SIRS criteria identified on my evaluation include: Tachycardia, with heart rate greater than 90 BPM and Leukocytosis, with WBC greater than 12,000  Source is E. Coli urinary tract infection   Sepsis protocol initiated  Fluid resuscitation ordered per protocol  IV antibiotics as appropriate for source of sepsis  While organ dysfunction may be noted elsewhere in this problem list or in the chart, degree of organ dysfunction does not meet CMS criteria for severe sepsis    UCx at OSH growing E. Coli   On IV Zosyn   Pt got 3 days of IV ABx at [facility] & 2 days of IV zosyn here, Can d/c ABx in AM or switched to Oral upon discharge.          VTE prophylaxis: SCDs/TEDs and enoxaparin  ppx    I have performed a physical exam and reviewed and updated ROS and Plan today (7/12/2021). In review of yesterday's note (7/11/2021), there are no changes except as documented above.

## 2021-07-13 VITALS
TEMPERATURE: 98.4 F | RESPIRATION RATE: 18 BRPM | OXYGEN SATURATION: 97 % | WEIGHT: 144.18 LBS | HEART RATE: 67 BPM | BODY MASS INDEX: 19.53 KG/M2 | DIASTOLIC BLOOD PRESSURE: 73 MMHG | HEIGHT: 72 IN | SYSTOLIC BLOOD PRESSURE: 116 MMHG

## 2021-07-13 LAB
BASOPHILS # BLD AUTO: 1 % (ref 0–1.8)
BASOPHILS # BLD: 0.1 K/UL (ref 0–0.12)
EOSINOPHIL # BLD AUTO: 0.28 K/UL (ref 0–0.51)
EOSINOPHIL NFR BLD: 2.8 % (ref 0–6.9)
ERYTHROCYTE [DISTWIDTH] IN BLOOD BY AUTOMATED COUNT: 47.1 FL (ref 35.9–50)
HCT VFR BLD AUTO: 39 % (ref 42–52)
HGB BLD-MCNC: 13.2 G/DL (ref 14–18)
IMM GRANULOCYTES # BLD AUTO: 0.15 K/UL (ref 0–0.11)
IMM GRANULOCYTES NFR BLD AUTO: 1.5 % (ref 0–0.9)
LYMPHOCYTES # BLD AUTO: 1.7 K/UL (ref 1–4.8)
LYMPHOCYTES NFR BLD: 17.2 % (ref 22–41)
MCH RBC QN AUTO: 32.8 PG (ref 27–33)
MCHC RBC AUTO-ENTMCNC: 33.8 G/DL (ref 33.7–35.3)
MCV RBC AUTO: 96.8 FL (ref 81.4–97.8)
MONOCYTES # BLD AUTO: 1.15 K/UL (ref 0–0.85)
MONOCYTES NFR BLD AUTO: 11.6 % (ref 0–13.4)
NEUTROPHILS # BLD AUTO: 6.5 K/UL (ref 1.82–7.42)
NEUTROPHILS NFR BLD: 65.9 % (ref 44–72)
NRBC # BLD AUTO: 0 K/UL
NRBC BLD-RTO: 0 /100 WBC
PLATELET # BLD AUTO: 745 K/UL (ref 164–446)
PMV BLD AUTO: 8.7 FL (ref 9–12.9)
RBC # BLD AUTO: 4.03 M/UL (ref 4.7–6.1)
WBC # BLD AUTO: 9.9 K/UL (ref 4.8–10.8)

## 2021-07-13 PROCEDURE — 700111 HCHG RX REV CODE 636 W/ 250 OVERRIDE (IP): Performed by: STUDENT IN AN ORGANIZED HEALTH CARE EDUCATION/TRAINING PROGRAM

## 2021-07-13 PROCEDURE — 36415 COLL VENOUS BLD VENIPUNCTURE: CPT

## 2021-07-13 PROCEDURE — 99239 HOSP IP/OBS DSCHRG MGMT >30: CPT | Performed by: HOSPITALIST

## 2021-07-13 PROCEDURE — 700105 HCHG RX REV CODE 258: Performed by: STUDENT IN AN ORGANIZED HEALTH CARE EDUCATION/TRAINING PROGRAM

## 2021-07-13 PROCEDURE — 85025 COMPLETE CBC W/AUTO DIFF WBC: CPT

## 2021-07-13 RX ADMIN — PIPERACILLIN AND TAZOBACTAM 3.38 G: 3; .375 INJECTION, POWDER, LYOPHILIZED, FOR SOLUTION INTRAVENOUS; PARENTERAL at 08:42

## 2021-07-13 RX ADMIN — PIPERACILLIN AND TAZOBACTAM 3.38 G: 3; .375 INJECTION, POWDER, LYOPHILIZED, FOR SOLUTION INTRAVENOUS; PARENTERAL at 00:40

## 2021-07-13 ASSESSMENT — PAIN DESCRIPTION - PAIN TYPE
TYPE: ACUTE PAIN

## 2021-07-13 NOTE — DISCHARGE SUMMARY
Discharge Summary    CHIEF COMPLAINT ON ADMISSION  No chief complaint on file.      Reason for Admission  Incarcerated Hernia     Admission Date  7/11/2021    CODE STATUS  Prior    HPI & HOSPITAL COURSE  This is a 66 y.o. male here with past medical history of multiple sclerosis was admitted in the hospital for sepsis, incarcerated left inguinal hernia and a UTI.  Patient was transferred from an outlying facility where he was being treated for E. coli UTI.  Upon arrival to the hospital where CT scan was completed that found a small right inguinal hernia.  Patient was given 5 days of IV Zosyn.  Surgery was consulted for the inguinal hernia and recommended an outpatient surgery in 1 to 2 weeks once his UTI has resolved.  Patient did not require any additional antibiotics since his course was completed within 5 days.  No notes on file    Therefore, he is discharged in good and stable condition to home with close outpatient follow-up.    The patient met 2-midnight criteria for an inpatient stay at the time of discharge.    Discharge Date  7/14/2021    FOLLOW UP ITEMS POST DISCHARGE  Follow up with surgery for hernia repair     DISCHARGE DIAGNOSES  Principal Problem:    Unilateral inguinal hernia POA: Unknown  Active Problems:    Sepsis secondary to UTI (HCC) POA: Unknown    Multiple sclerosis (HCC) POA: Unknown    Tobacco dependence POA: Unknown  Resolved Problems:    * No resolved hospital problems. *      FOLLOW UP  No future appointments.  Dwight Barrera M.D.  [address] [address]  [phone]    In 1 week      Primary Care Provider    Schedule an appointment as soon as possible for a visit in 1 week      Pcp Pt States None            MEDICATIONS ON DISCHARGE     Medication List      CONTINUE taking these medications      Instructions   baclofen 10 MG Tabs  Commonly known as: LIORESAL   Take 20 mg by mouth 3 times a day as needed.  Dose: 20 mg     gabapentin 400 MG Caps  Commonly  known as: NEURONTIN   Take 800 mg by mouth 3 times a day as needed.  Dose: 800 mg            Allergies  No Known Allergies    DIET  No orders of the defined types were placed in this encounter.      ACTIVITY  As tolerated.  Weight bearing as tolerated    CONSULTATIONS  Surgery    PROCEDURES  None    LABORATORY  Lab Results   Component Value Date    SODIUM 133 (L) 07/12/2021    POTASSIUM 4.8 07/12/2021    CHLORIDE 95 (L) 07/12/2021    CO2 28 07/12/2021    GLUCOSE 93 07/12/2021    BUN 11 07/12/2021    CREATININE 0.74 07/12/2021        Lab Results   Component Value Date    WBC 9.9 07/13/2021    HEMOGLOBIN 13.2 (L) 07/13/2021    HEMATOCRIT 39.0 (L) 07/13/2021    PLATELETCT 745 (H) 07/13/2021        Total time of the discharge process exceeds 50 minutes.

## 2021-07-13 NOTE — CARE PLAN
The patient is Stable - Low risk of patient condition declining or worsening    Shift Goals  Clinical Goals: pain control  Patient Goals: discharge    Progress made toward(s) clinical / shift goals:  pt is discharging today and pain is well managed    Problem: Pain - Standard  Goal: Alleviation of pain or a reduction in pain to the patient’s comfort goal  Outcome: Progressing     Problem: Fall Risk  Goal: Patient will remain free from falls  Outcome: Progressing       Patient is not progressing towards the following goals:

## 2021-07-13 NOTE — PROGRESS NOTES
Pt is being discharged from the facility. Pts IV has been removed. Dsicharge paperwork has been reviewed and signed. Pt has been wheeled out to his ride.

## 2021-07-13 NOTE — DISCHARGE INSTRUCTIONS
Discharge Instructions    Discharged to home by car with friend. Discharged via wheelchair, hospital escort: Yes.  Special equipment needed: Not Applicable    Be sure to schedule a follow-up appointment with your primary care doctor or any specialists as instructed.     Discharge Plan:   Diet Plan: Discussed  Activity Level: Discussed  Confirmed Follow up Appointment: Patient to Call and Schedule Appointment  Confirmed Symptoms Management: Discussed  Medication Reconciliation Updated: Yes    I understand that a diet low in cholesterol, fat, and sodium is recommended for good health. Unless I have been given specific instructions below for another diet, I accept this instruction as my diet prescription.   Other diet: regular      Special Instructions: None    · Is patient discharged on Warfarin / Coumadin?   No     Depression / Suicide Risk    As you are discharged from this [facility] facility, it is important to learn how to keep safe from harming yourself.    Recognize the warning signs:  · Abrupt changes in personality, positive or negative- including increase in energy   · Giving away possessions  · Change in eating patterns- significant weight changes-  positive or negative  · Change in sleeping patterns- unable to sleep or sleeping all the time   · Unwillingness or inability to communicate  · Depression  · Unusual sadness, discouragement and loneliness  · Talk of wanting to die  · Neglect of personal appearance   · Rebelliousness- reckless behavior  · Withdrawal from people/activities they love  · Confusion- inability to concentrate     If you or a loved one observes any of these behaviors or has concerns about self-harm, here's what you can do:  · Talk about it- your feelings and reasons for harming yourself  · Remove any means that you might use to hurt yourself (examples: pills, rope, extension cords, firearm)  · Get professional help from the community (Mental Health, Substance Abuse, psychological  counseling)  · Do not be alone:Call your Safe Contact- someone whom you trust who will be there for you.  · Call your local CRISIS HOTLINE 654-4902 or 152-397-2418  · Call your local Children's Mobile Crisis Response Team [place] (957) 461-4768 or www.[place]  · Call the toll free National Suicide Prevention Hotlines   · National Suicide Prevention Lifeline 022-629-FCCZ (1409)  · National Hope Line Network 800-SUICIDE (188-9353)

## 2021-07-16 LAB
BACTERIA BLD CULT: NORMAL
BACTERIA BLD CULT: NORMAL
SIGNIFICANT IND 70042: NORMAL
SIGNIFICANT IND 70042: NORMAL
SITE SITE: NORMAL
SITE SITE: NORMAL
SOURCE SOURCE: NORMAL
SOURCE SOURCE: NORMAL

## 2021-08-26 ENCOUNTER — PRE-ADMISSION TESTING (OUTPATIENT)
Dept: ADMISSIONS | Facility: MEDICAL CENTER | Age: 66
End: 2021-08-26
Attending: SURGERY
Payer: MEDICARE

## 2021-08-26 RX ORDER — ACETAMINOPHEN, DEXTROMETHORPHAN HYDROBROMIDE, DOXYLAMINE SUCCINATE, PHENYLEPHRINE HYDROCHLORIDE 650; 20; 12.5; 1 MG/30ML; MG/30ML; MG/30ML; MG/30ML
1 SOLUTION ORAL DAILY
COMMUNITY

## 2021-08-26 RX ORDER — GABAPENTIN 800 MG/1
TABLET ORAL
COMMUNITY
Start: 2021-08-20

## 2021-08-26 RX ORDER — SULFAMETHOXAZOLE AND TRIMETHOPRIM 800; 160 MG/1; MG/1
1 TABLET ORAL
COMMUNITY
Start: 2021-06-28

## 2021-08-26 RX ORDER — BACLOFEN 20 MG
1 TABLET ORAL DAILY
COMMUNITY

## 2021-08-26 RX ORDER — NITROFURANTOIN MACROCRYSTALS 100 MG/1
100 CAPSULE ORAL
COMMUNITY
Start: 2021-07-27

## 2021-08-26 RX ORDER — OFATUMUMAB 20 MG/.4ML
20 INJECTION, SOLUTION SUBCUTANEOUS
COMMUNITY
Start: 2021-03-30

## 2021-08-26 NOTE — PREPROCEDURE INSTRUCTIONS
"Pre-admit appointment completed. \"Preparing for your Procedure\" instructions given to Pt via phone with verbal, emailed written instructions, along with video content. Pt states all instructions given are understood and to call pre-admit or Dr's office for additional questions or any symptoms of illness/covid develop prior to DOS. Medications the patient will take the morning of surgery per anesthesia protocol: Baclofen, Gabapentin if needed.    Denies anesthesia complications  Pt to get Covid Test at [facility] in [place], [place] on 8/26/21. Will fax results to [phone] and [phone] extensions.  "

## 2021-08-31 ENCOUNTER — ANESTHESIA (OUTPATIENT)
Dept: SURGERY | Facility: MEDICAL CENTER | Age: 66
End: 2021-08-31
Payer: MEDICARE

## 2021-08-31 ENCOUNTER — ANESTHESIA EVENT (OUTPATIENT)
Dept: SURGERY | Facility: MEDICAL CENTER | Age: 66
End: 2021-08-31
Payer: MEDICARE

## 2021-08-31 ENCOUNTER — HOSPITAL ENCOUNTER (OUTPATIENT)
Facility: MEDICAL CENTER | Age: 66
End: 2021-08-31
Attending: SURGERY | Admitting: SURGERY
Payer: MEDICARE

## 2021-08-31 VITALS
OXYGEN SATURATION: 91 % | HEIGHT: 71 IN | HEART RATE: 83 BPM | RESPIRATION RATE: 18 BRPM | DIASTOLIC BLOOD PRESSURE: 81 MMHG | BODY MASS INDEX: 20.31 KG/M2 | WEIGHT: 145.06 LBS | TEMPERATURE: 97.2 F | SYSTOLIC BLOOD PRESSURE: 139 MMHG

## 2021-08-31 DIAGNOSIS — G89.18 POST-OPERATIVE PAIN: ICD-10-CM

## 2021-08-31 PROCEDURE — 700105 HCHG RX REV CODE 258: Performed by: SURGERY

## 2021-08-31 PROCEDURE — 500002 HCHG ADHESIVE, DERMABOND: Performed by: SURGERY

## 2021-08-31 PROCEDURE — 160025 RECOVERY II MINUTES (STATS): Performed by: SURGERY

## 2021-08-31 PROCEDURE — 500868 HCHG NEEDLE, SURGI(VARES): Performed by: SURGERY

## 2021-08-31 PROCEDURE — 700102 HCHG RX REV CODE 250 W/ 637 OVERRIDE(OP): Performed by: ANESTHESIOLOGY

## 2021-08-31 PROCEDURE — 700111 HCHG RX REV CODE 636 W/ 250 OVERRIDE (IP): Performed by: ANESTHESIOLOGY

## 2021-08-31 PROCEDURE — 160029 HCHG SURGERY MINUTES - 1ST 30 MINS LEVEL 4: Performed by: SURGERY

## 2021-08-31 PROCEDURE — 160009 HCHG ANES TIME/MIN: Performed by: SURGERY

## 2021-08-31 PROCEDURE — 502714 HCHG ROBOTIC SURGERY SERVICES: Performed by: SURGERY

## 2021-08-31 PROCEDURE — 160002 HCHG RECOVERY MINUTES (STAT): Performed by: SURGERY

## 2021-08-31 PROCEDURE — 700101 HCHG RX REV CODE 250: Performed by: ANESTHESIOLOGY

## 2021-08-31 PROCEDURE — 700101 HCHG RX REV CODE 250: Performed by: SURGERY

## 2021-08-31 PROCEDURE — 160035 HCHG PACU - 1ST 60 MINS PHASE I: Performed by: SURGERY

## 2021-08-31 PROCEDURE — 501838 HCHG SUTURE GENERAL: Performed by: SURGERY

## 2021-08-31 PROCEDURE — 160041 HCHG SURGERY MINUTES - EA ADDL 1 MIN LEVEL 4: Performed by: SURGERY

## 2021-08-31 PROCEDURE — 700105 HCHG RX REV CODE 258: Performed by: ANESTHESIOLOGY

## 2021-08-31 PROCEDURE — 160048 HCHG OR STATISTICAL LEVEL 1-5: Performed by: SURGERY

## 2021-08-31 PROCEDURE — 160046 HCHG PACU - 1ST 60 MINS PHASE II: Performed by: SURGERY

## 2021-08-31 PROCEDURE — A9270 NON-COVERED ITEM OR SERVICE: HCPCS | Performed by: ANESTHESIOLOGY

## 2021-08-31 PROCEDURE — C1781 MESH (IMPLANTABLE): HCPCS | Performed by: SURGERY

## 2021-08-31 DEVICE — MESH PROGRIP LAPROSCOPIC SELF FIXATING (1/CA): Type: IMPLANTABLE DEVICE | Site: ABDOMEN | Status: FUNCTIONAL

## 2021-08-31 RX ORDER — HYDROMORPHONE HYDROCHLORIDE 2 MG/ML
INJECTION, SOLUTION INTRAMUSCULAR; INTRAVENOUS; SUBCUTANEOUS PRN
Status: DISCONTINUED | OUTPATIENT
Start: 2021-08-31 | End: 2021-08-31 | Stop reason: SURG

## 2021-08-31 RX ORDER — ONDANSETRON 2 MG/ML
4 INJECTION INTRAMUSCULAR; INTRAVENOUS
Status: DISCONTINUED | OUTPATIENT
Start: 2021-08-31 | End: 2021-08-31 | Stop reason: HOSPADM

## 2021-08-31 RX ORDER — SODIUM CHLORIDE, SODIUM LACTATE, POTASSIUM CHLORIDE, CALCIUM CHLORIDE 600; 310; 30; 20 MG/100ML; MG/100ML; MG/100ML; MG/100ML
INJECTION, SOLUTION INTRAVENOUS CONTINUOUS
Status: DISCONTINUED | OUTPATIENT
Start: 2021-08-31 | End: 2021-08-31 | Stop reason: HOSPADM

## 2021-08-31 RX ORDER — HYDROMORPHONE HYDROCHLORIDE 1 MG/ML
0.2 INJECTION, SOLUTION INTRAMUSCULAR; INTRAVENOUS; SUBCUTANEOUS
Status: DISCONTINUED | OUTPATIENT
Start: 2021-08-31 | End: 2021-08-31 | Stop reason: HOSPADM

## 2021-08-31 RX ORDER — ONDANSETRON 2 MG/ML
INJECTION INTRAMUSCULAR; INTRAVENOUS PRN
Status: DISCONTINUED | OUTPATIENT
Start: 2021-08-31 | End: 2021-08-31 | Stop reason: SURG

## 2021-08-31 RX ORDER — CEFAZOLIN SODIUM 1 G/3ML
INJECTION, POWDER, FOR SOLUTION INTRAMUSCULAR; INTRAVENOUS PRN
Status: DISCONTINUED | OUTPATIENT
Start: 2021-08-31 | End: 2021-08-31 | Stop reason: SURG

## 2021-08-31 RX ORDER — OXYCODONE HCL 10 MG/1
10 TABLET, FILM COATED, EXTENDED RELEASE ORAL ONCE
Status: COMPLETED | OUTPATIENT
Start: 2021-08-31 | End: 2021-08-31

## 2021-08-31 RX ORDER — ACETAMINOPHEN 500 MG
1000 TABLET ORAL ONCE
Status: COMPLETED | OUTPATIENT
Start: 2021-08-31 | End: 2021-08-31

## 2021-08-31 RX ORDER — ROCURONIUM BROMIDE 10 MG/ML
INJECTION, SOLUTION INTRAVENOUS PRN
Status: DISCONTINUED | OUTPATIENT
Start: 2021-08-31 | End: 2021-08-31 | Stop reason: SURG

## 2021-08-31 RX ORDER — HYDROMORPHONE HYDROCHLORIDE 1 MG/ML
0.1 INJECTION, SOLUTION INTRAMUSCULAR; INTRAVENOUS; SUBCUTANEOUS
Status: DISCONTINUED | OUTPATIENT
Start: 2021-08-31 | End: 2021-08-31 | Stop reason: HOSPADM

## 2021-08-31 RX ORDER — MAGNESIUM SULFATE HEPTAHYDRATE 500 MG/ML
INJECTION, SOLUTION INTRAMUSCULAR; INTRAVENOUS PRN
Status: DISCONTINUED | OUTPATIENT
Start: 2021-08-31 | End: 2021-08-31 | Stop reason: SURG

## 2021-08-31 RX ORDER — DIPHENHYDRAMINE HYDROCHLORIDE 50 MG/ML
12.5 INJECTION INTRAMUSCULAR; INTRAVENOUS
Status: DISCONTINUED | OUTPATIENT
Start: 2021-08-31 | End: 2021-08-31 | Stop reason: HOSPADM

## 2021-08-31 RX ORDER — MEPERIDINE HYDROCHLORIDE 50 MG/ML
12.5 INJECTION INTRAMUSCULAR; INTRAVENOUS; SUBCUTANEOUS
Status: DISCONTINUED | OUTPATIENT
Start: 2021-08-31 | End: 2021-08-31 | Stop reason: HOSPADM

## 2021-08-31 RX ORDER — OXYCODONE HCL 5 MG/5 ML
10 SOLUTION, ORAL ORAL
Status: DISCONTINUED | OUTPATIENT
Start: 2021-08-31 | End: 2021-08-31 | Stop reason: HOSPADM

## 2021-08-31 RX ORDER — TRAMADOL HYDROCHLORIDE 50 MG/1
50 TABLET ORAL EVERY 4 HOURS PRN
Qty: 30 TABLET | Refills: 0 | Status: SHIPPED | OUTPATIENT
Start: 2021-08-31 | End: 2021-09-07

## 2021-08-31 RX ORDER — OXYCODONE HCL 5 MG/5 ML
5 SOLUTION, ORAL ORAL
Status: DISCONTINUED | OUTPATIENT
Start: 2021-08-31 | End: 2021-08-31 | Stop reason: HOSPADM

## 2021-08-31 RX ORDER — DEXAMETHASONE SODIUM PHOSPHATE 4 MG/ML
INJECTION, SOLUTION INTRA-ARTICULAR; INTRALESIONAL; INTRAMUSCULAR; INTRAVENOUS; SOFT TISSUE PRN
Status: DISCONTINUED | OUTPATIENT
Start: 2021-08-31 | End: 2021-08-31 | Stop reason: SURG

## 2021-08-31 RX ORDER — BUPIVACAINE HYDROCHLORIDE AND EPINEPHRINE 5; 5 MG/ML; UG/ML
INJECTION, SOLUTION EPIDURAL; INTRACAUDAL; PERINEURAL
Status: DISCONTINUED | OUTPATIENT
Start: 2021-08-31 | End: 2021-08-31 | Stop reason: HOSPADM

## 2021-08-31 RX ORDER — LIDOCAINE HYDROCHLORIDE 20 MG/ML
INJECTION, SOLUTION EPIDURAL; INFILTRATION; INTRACAUDAL; PERINEURAL PRN
Status: DISCONTINUED | OUTPATIENT
Start: 2021-08-31 | End: 2021-08-31 | Stop reason: SURG

## 2021-08-31 RX ORDER — HALOPERIDOL 5 MG/ML
1 INJECTION INTRAMUSCULAR
Status: DISCONTINUED | OUTPATIENT
Start: 2021-08-31 | End: 2021-08-31 | Stop reason: HOSPADM

## 2021-08-31 RX ORDER — MIDAZOLAM HYDROCHLORIDE 1 MG/ML
1 INJECTION INTRAMUSCULAR; INTRAVENOUS
Status: DISCONTINUED | OUTPATIENT
Start: 2021-08-31 | End: 2021-08-31 | Stop reason: HOSPADM

## 2021-08-31 RX ORDER — LABETALOL HYDROCHLORIDE 5 MG/ML
5 INJECTION, SOLUTION INTRAVENOUS
Status: DISCONTINUED | OUTPATIENT
Start: 2021-08-31 | End: 2021-08-31 | Stop reason: HOSPADM

## 2021-08-31 RX ORDER — HYDROMORPHONE HYDROCHLORIDE 1 MG/ML
0.4 INJECTION, SOLUTION INTRAMUSCULAR; INTRAVENOUS; SUBCUTANEOUS
Status: DISCONTINUED | OUTPATIENT
Start: 2021-08-31 | End: 2021-08-31 | Stop reason: HOSPADM

## 2021-08-31 RX ADMIN — ACETAMINOPHEN 1000 MG: 500 TABLET, FILM COATED ORAL at 11:54

## 2021-08-31 RX ADMIN — ROCURONIUM BROMIDE 50 MG: 10 INJECTION, SOLUTION INTRAVENOUS at 13:00

## 2021-08-31 RX ADMIN — LIDOCAINE HYDROCHLORIDE 100 MG: 20 INJECTION, SOLUTION EPIDURAL; INFILTRATION; INTRACAUDAL; PERINEURAL at 13:00

## 2021-08-31 RX ADMIN — EPHEDRINE SULFATE 10 MG: 50 INJECTION INTRAMUSCULAR; INTRAVENOUS; SUBCUTANEOUS at 13:07

## 2021-08-31 RX ADMIN — SODIUM CHLORIDE, POTASSIUM CHLORIDE, SODIUM LACTATE AND CALCIUM CHLORIDE 1000 ML: 600; 310; 30; 20 INJECTION, SOLUTION INTRAVENOUS at 13:57

## 2021-08-31 RX ADMIN — DEXAMETHASONE SODIUM PHOSPHATE 8 MG: 4 INJECTION, SOLUTION INTRAMUSCULAR; INTRAVENOUS at 13:00

## 2021-08-31 RX ADMIN — SUGAMMADEX 200 MG: 100 INJECTION, SOLUTION INTRAVENOUS at 13:36

## 2021-08-31 RX ADMIN — OXYCODONE HYDROCHLORIDE 10 MG: 10 TABLET, FILM COATED, EXTENDED RELEASE ORAL at 11:54

## 2021-08-31 RX ADMIN — MAGNESIUM SULFATE HEPTAHYDRATE 2 G: 500 INJECTION, SOLUTION INTRAMUSCULAR; INTRAVENOUS at 13:00

## 2021-08-31 RX ADMIN — SODIUM CHLORIDE, POTASSIUM CHLORIDE, SODIUM LACTATE AND CALCIUM CHLORIDE: 600; 310; 30; 20 INJECTION, SOLUTION INTRAVENOUS at 11:35

## 2021-08-31 RX ADMIN — FENTANYL CITRATE 50 MCG: 50 INJECTION, SOLUTION INTRAMUSCULAR; INTRAVENOUS at 13:52

## 2021-08-31 RX ADMIN — HYDROMORPHONE HYDROCHLORIDE 0.5 MCG: 2 INJECTION, SOLUTION INTRAMUSCULAR; INTRAVENOUS; SUBCUTANEOUS at 14:24

## 2021-08-31 RX ADMIN — SODIUM CHLORIDE, POTASSIUM CHLORIDE, SODIUM LACTATE AND CALCIUM CHLORIDE: 600; 310; 30; 20 INJECTION, SOLUTION INTRAVENOUS at 13:00

## 2021-08-31 RX ADMIN — FENTANYL CITRATE 50 MCG: 50 INJECTION, SOLUTION INTRAMUSCULAR; INTRAVENOUS at 14:04

## 2021-08-31 RX ADMIN — LIDOCAINE HYDROCHLORIDE 0.5 ML: 10 INJECTION, SOLUTION INFILTRATION; PERINEURAL at 11:35

## 2021-08-31 RX ADMIN — CEFAZOLIN 2 G: 330 INJECTION, POWDER, FOR SOLUTION INTRAMUSCULAR; INTRAVENOUS at 13:02

## 2021-08-31 RX ADMIN — SODIUM CHLORIDE, POTASSIUM CHLORIDE, SODIUM LACTATE AND CALCIUM CHLORIDE: 600; 310; 30; 20 INJECTION, SOLUTION INTRAVENOUS at 12:56

## 2021-08-31 RX ADMIN — HYDROMORPHONE HYDROCHLORIDE 0.5 MG: 2 INJECTION, SOLUTION INTRAMUSCULAR; INTRAVENOUS; SUBCUTANEOUS at 13:20

## 2021-08-31 RX ADMIN — ONDANSETRON 4 MG: 2 INJECTION INTRAMUSCULAR; INTRAVENOUS at 13:00

## 2021-08-31 ASSESSMENT — PAIN SCALES - GENERAL: PAIN_LEVEL: 2

## 2021-08-31 ASSESSMENT — FIBROSIS 4 INDEX: FIB4 SCORE: 0.28

## 2021-08-31 NOTE — ANESTHESIA TIME REPORT
Anesthesia Start and Stop Event Times     Date Time Event    8/31/2021 1251 Ready for Procedure     1256 Anesthesia Start     1343 Anesthesia Stop        Responsible Staff  08/31/21    Name Role Begin End    Aminata Martinez M.D. Anesth 1256 1343        Preop Diagnosis (Free Text):  Pre-op Diagnosis     BILATERAL INGUINAL HERNIAS        Preop Diagnosis (Codes):    Post op Diagnosis  Inguinal hernia      Premium Reason  Non-Premium    Comments:

## 2021-08-31 NOTE — ANESTHESIA POSTPROCEDURE EVALUATION
Patient: Chidi Ibrahim    Procedure Summary     Date: 08/31/21 Room / Location:  OR  / SURGERY [facility]    Anesthesia Start: 1256 Anesthesia Stop: 1343    Procedure: REPAIR, HERNIA, INGUINAL, ROBOT-ASSISTED, USING DA ANGY XI - WITH MESH PLACEMENT (Right Abdomen) Diagnosis: (BILATERAL INGUINAL HERNIAS)    Surgeons: Dwight Barrera M.D. Responsible Provider: Aminata Martinez M.D.    Anesthesia Type: general ASA Status: 2          Final Anesthesia Type: general  Last vitals  BP   Blood Pressure: 117/83    Temp   36 °C (96.8 °F)    Pulse   69   Resp   16    SpO2   97 %      Anesthesia Post Evaluation    Patient location during evaluation: PACU  Patient participation: complete - patient participated  Level of consciousness: awake and alert  Pain score: 2    Airway patency: patent  Anesthetic complications: no  Cardiovascular status: adequate and hemodynamically stable  Respiratory status: acceptable  Hydration status: acceptable    PONV: none          No complications documented.     Nurse Pain Score: 2 (NPRS)

## 2021-08-31 NOTE — ANESTHESIA PROCEDURE NOTES
Airway    Date/Time: 8/31/2021 1:01 PM  Performed by: Aminata Martinez M.D.  Authorized by: Aminata Martinez M.D.     Location:  OR  Urgency:  Elective  Indications for Airway Management:  Anesthesia      Spontaneous Ventilation: absent    Sedation Level:  Deep  Preoxygenated: Yes    Mask Difficulty Assessment:  0 - not attempted  Final Airway Type:  Endotracheal airway  Final Endotracheal Airway:  ETT  Cuffed: Yes    Technique Used for Successful ETT Placement:  Direct laryngoscopy    Insertion Site:  Oral  Blade Type:  Melendrez  Laryngoscope Blade/Videolaryngoscope Blade Size:  2  ETT Size (mm):  7.5  Measured from:  Lips  ETT to Lips (cm):  21  Placement Verified by: capnometry    Cormack-Lehane Classification:  Grade I - full view of glottis  Number of Attempts at Approach:  1

## 2021-08-31 NOTE — ANESTHESIA PREPROCEDURE EVALUATION
66yoM with MS    Allergies to codeine  +tobacco  Covid neg  NPO  No AC    Relevant Problems   No relevant active problems       Physical Exam    Airway   Mallampati: II       Cardiovascular - normal exam     Dental - normal exam           Pulmonary - normal exam     Abdominal - normal exam     Neurological - normal exam                 Anesthesia Plan    ASA 2       Plan - general       Airway plan will be ETT          Induction: intravenous    Postoperative Plan: Postoperative administration of opioids is intended.    Pertinent diagnostic labs and testing reviewed    Informed Consent:    Anesthetic plan and risks discussed with patient.

## 2021-08-31 NOTE — DISCHARGE INSTRUCTIONS
ACTIVITY: Rest and take it easy for the first 24 hours.  A responsible adult is recommended to remain with you during that time.  It is normal to feel sleepy.  We encourage you to not do anything that requires balance, judgment or coordination.    MILD FLU-LIKE SYMPTOMS ARE NORMAL. YOU MAY EXPERIENCE GENERALIZED MUSCLE ACHES, THROAT IRRITATION, HEADACHE AND/OR SOME NAUSEA.    FOR 24 HOURS DO NOT:  Drive, operate machinery or run household appliances.  Drink beer or alcoholic beverages.   Make important decisions or sign legal documents.    SPECIAL INSTRUCTIONS:       Laparoscopic Inguinal Hernia Repair, Adult, Care After    This sheet gives you information about how to care for yourself after your procedure. Your health care provider may also give you more specific instructions. If you have problems or questions, contact your health care provider.  What can I expect after the procedure?  After the procedure, it is common to have:  · Pain.  · Swelling and bruising around the incision area.  · Scrotal swelling, in men.  · Some fluid or blood draining from your incisions.  Follow these instructions at home:  Incision care  · Follow instructions from your health care provider about how to take care of your incisions. Make sure you:  ? Wash your hands with soap and water before you change your bandage (dressing). If soap and water are not available, use hand .  ? Change your dressing as told by your health care provider.  ? Leave stitches (sutures), skin glue, or adhesive strips in place. These skin closures may need to stay in place for 2 weeks or longer. If adhesive strip edges start to loosen and curl up, you may trim the loose edges. Do not remove adhesive strips completely unless your health care provider tells you to do that.  · Check your incision area every day for signs of infection. Check for:  ? More redness, swelling, or pain.  ? More fluid or blood.  ? Warmth.  ? Pus or a bad smell.  · Wear loose,  soft clothing while your incisions heal.  Driving  · Do not drive or use heavy machinery while taking prescription pain medicine.  · Do not drive for 24 hours if you were given a medicine to help you relax (sedative) during your procedure.  Activity  · Do not lift anything that is heavier than 10 lb (4.5 kg), or the limit that you are told, until your health care provider says that it is safe.  · Ask your health care provider what activities are safe for you. A lot of activity during the first week after surgery can increase pain and swelling. For 1 week after your procedure:  ? Avoid activities that take a lot of effort, such as exercise or sports.  ? You may walk and climb stairs as needed for daily activity, but avoid long walks or climbing stairs for exercise.  Managing pain and swelling    · Put ice on painful or swollen areas:  ? Put ice in a plastic bag.  ? Place a towel between your skin and the bag.  ? Leave the ice on for 20 minutes, 2-3 times a day.  General instructions  · Do not take baths, swim, or use a hot tub until your health care provider approves. Ask your health care provider if you may take showers. You may only be allowed to take sponge baths.  · Take over-the-counter and prescription medicines only as told by your health care provider.  · To prevent or treat constipation while you are taking prescription pain medicine, your health care provider may recommend that you:  ? Drink enough fluid to keep your urine pale yellow.  ? Take over-the-counter or prescription medicines.  ? Eat foods that are high in fiber, such as fresh fruits and vegetables, whole grains, and beans.  ? Limit foods that are high in fat and processed sugars, such as fried and sweet foods.  · Do not use any products that contain nicotine or tobacco, such as cigarettes and e-cigarettes. If you need help quitting, ask your health care provider.  · Drink enough fluid to keep your urine pale yellow.  · Keep all follow-up visits  as told by your health care provider. This is important.  Contact a health care provider if:  · You have more redness, swelling, or pain around your incisions or your groin area.  · You have more swelling in your scrotum.  · You have more fluid or blood coming from your incisions.  · Your incisions feel warm to the touch.  · You have severe pain and medicines do not help.  · You have abdominal pain or swelling.  · You cannot eat or drink without vomiting.  · You cannot urinate or pass a bowel movement.  · You faint.  · You feel dizzy.  · You have nausea and vomiting.  · You have a fever.  Get help right away if:  · You have pus or a bad smell coming from your incisions.  · You have chest pain.  · You have problems breathing.  Summary  · Pain, swelling, and bruising are common after the procedure.  · Check your incision area every day for signs of infection, such as more redness, swelling, or pain.  · Put ice on painful or swollen areas for 20 minutes, 2-3 times a day.  This information is not intended to replace advice given to you by your health care provider. Make sure you discuss any questions you have with your health care provider.  Document Released: 03/29/2018 Document Revised: 12/21/2018 Document Reviewed: 03/29/2018  [employer] Patient Education © 2020 [employer]      DIET: To avoid nausea, slowly advance diet as tolerated, avoiding spicy or greasy foods for the first day.  Add more substantial food to your diet according to your physician's instructions.  Babies can be fed formula or breast milk as soon as they are hungry.  INCREASE FLUIDS AND FIBER TO AVOID CONSTIPATION.        FOLLOW-UP APPOINTMENT:  A follow-up appointment should be arranged with your doctor; call to schedule.    You should CALL YOUR PHYSICIAN if you develop:  Fever greater than 101 degrees F.  Pain not relieved by medication, or persistent nausea or vomiting.  Excessive bleeding (blood soaking through dressing) or unexpected drainage  from the wound.  Extreme redness or swelling around the incision site, drainage of pus or foul smelling drainage.  Inability to urinate or empty your bladder within 8 hours.  Problems with breathing or chest pain.    You should call 911 if you develop problems with breathing or chest pain.  If you are unable to contact your doctor or surgical center, you should go to the nearest emergency room or urgent care center.  Physician's telephone #: DR. SAMPSON  [phone]    If any questions arise, call your doctor.  If your doctor is not available, please feel free to call the Surgical Center at [phone]. The Contact Center is open Monday through Friday 7AM to 5PM and may speak to a nurse at [phone], or toll free at [phone].     A registered nurse may call you a few days after your surgery to see how you are doing after your procedure.    MEDICATIONS: Resume taking daily medication.  Take prescribed pain medication with food.  If no medication is prescribed, you may take non-aspirin pain medication if needed.  PAIN MEDICATION CAN BE VERY CONSTIPATING.  Take a stool softener or laxative such as senokot, pericolace, or milk of magnesia if needed.       these medications from [id number] - [place], [place] - [id number] [facility]    Last pain medication given at 11:54  (OXYCONTIN 10MG)    If your physician has prescribed pain medication that includes Acetaminophen (Tylenol), do not take additional Acetaminophen (Tylenol) while taking the prescribed medication.    Depression / Suicide Risk    As you are discharged from this [facility] facility, it is important to learn how to keep safe from harming yourself.    Recognize the warning signs:  · Abrupt changes in personality, positive or negative- including increase in energy   · Giving away possessions  · Change in eating patterns- significant weight changes-  positive or negative  · Change in sleeping  patterns- unable to sleep or sleeping all the time   · Unwillingness or inability to communicate  · Depression  · Unusual sadness, discouragement and loneliness  · Talk of wanting to die  · Neglect of personal appearance   · Rebelliousness- reckless behavior  · Withdrawal from people/activities they love  · Confusion- inability to concentrate     If you or a loved one observes any of these behaviors or has concerns about self-harm, here's what you can do:  · Talk about it- your feelings and reasons for harming yourself  · Remove any means that you might use to hurt yourself (examples: pills, rope, extension cords, firearm)  · Get professional help from the community (Mental Health, Substance Abuse, psychological counseling)  · Do not be alone:Call your Safe Contact- someone whom you trust who will be there for you.  · Call your local CRISIS HOTLINE 210-6907 or 083-063-4984  · Call your local Children's Mobile Crisis Response Team [place] (641) 389-8707 or www.[employer]  · Call the toll free National Suicide Prevention Hotlines   · National Suicide Prevention Lifeline 748-217-ZOHT (8009)  · National Hope Line Network 800-SUICIDE (752-2294)

## 2021-08-31 NOTE — ADDENDUM NOTE
Addendum  created 08/31/21 1425 by Aminata Martinez M.D.    Flowsheet accepted, Intraprocedure Flowsheets edited

## 2021-08-31 NOTE — OP REPORT
OP Note    PreOp Diagnosis: bilateral inguinal hernia      PostOp Diagnosis: right indirect inguinal hernia, no inguinal hernia seen on left      Procedure(s):  1. Laparoscopic robotic assisted right inguinal hernia  2. Implantation of mesh into right - Wound Class: Clean    Surgeon(s):  Dwight Barrera M.D.    Assistant(s):  Kari Hardwick M.D.    Anesthesiologist/Type of Anesthesia:  Anesthesiologist: Aminata Martinez M.D./General    Surgical Staff:  Circulator: Favian Novoa R.N.  Scrub Person: Gregory Beck    Specimens removed if any:  * No specimens in log *    Estimated Blood Loss: 5cc    Findings: no hernia seen on left    Complications: none    The patient was met in the preoperative holding area where all of the potential risks and benefits (including recurrence, pain, need for additional surgeries, stroke, death, damage to testicle) of the procedure were discussed in detail with the patient. All of his questions were answered to his satisfaction and informed consent was signed. The patient was taken back to the operating room and placed supine on the operating room table. General endotracheal anesthesia was induced and all of the patients pressure points were padded appropriately. The patients abdomen was prepped and draped in the usual sterile fashion.  A timeout was performed which correctly identified the patient and the procedure being performed and preoperative antibiotics were given according to SCIP guidelines.     The procedure was started with infiltration of 0.5% marcaine into the lateral abdominal wall skin. A 8mm incision was made and carried down to fascia. The verress needle was inserted into the abdominal cavity and insufflation carried out to 15mmHg.  Two additional robotic trochars were then placed under direct vision. The camera was inserted into the abdominal cavity and a brief survey of the abdominal cavity demonstrated no obvious injuries or pathologies other  than indirect right inguinal hernia.  I then took my place at the console.    I began by creating a preperitoneal space on the right side of the anterior abdominal wall just superior to the arcuate line.  I gently dissected out the retrofascial plane until I reached Coopers ligament medially and adequate space was opened laterally.  I then freed up the space immediately lateral to the cord to make adequate space for the mesh.  I then skeletonized the cord and reduced the hernia back to well within the abdomen.  Once the critical view of the myopectineal orifice was obtained, I placed a 10 x 15 cm progrip mesh with rounded corners.  This lay flat against the abdominal wall with excellent coverage of the defect with additional mesh laid medially and inferiorly.  I used a 15 cm Stratisfix suture to reapproximate the peritoneum.  This completely hid the mesh from the abdominal cavity.  Hemostasis was achieved.  The robot was then undocked after the needles were withdrawn under direct visualization.  Monocryl was used for skin edges and Dermabond was applied as a dressing.      The patient tolerated the procedure well and was extubated at the conclusion of the case without incident. All of the sponge, needle and instrument counts were correct at the conclusion of the case. After he was awoken from anesthesia he was taken to the recovery room in stable condition.        8/31/2021 1:34 PM Dwight Barrera M.D.

## 2023-05-03 NOTE — ASSESSMENT & PLAN NOTE
Stable    Otezla Pregnancy And Lactation Text: This medication is Pregnancy Category C and it isn't known if it is safe during pregnancy. It is unknown if it is excreted in breast milk.

## (undated) DEVICE — DRAPE COLUMN  BOX OF 20

## (undated) DEVICE — GLOVE BIOGEL SZ 7 SURGICAL PF LTX - (50PR/BX 4BX/CA)

## (undated) DEVICE — SUCTION INSTRUMENT YANKAUER BULBOUS TIP W/O VENT (50EA/CA)

## (undated) DEVICE — NEEDLE DRIVER MEGA SUTURECUT DA VINCI 15X'S REUSABLE

## (undated) DEVICE — SENSOR SPO2 NEO LNCS ADHESIVE (20/BX) SEE USER NOTES

## (undated) DEVICE — GLOVE BIOGEL INDICATOR SZ 7SURGICAL PF LTX - (50/BX 4BX/CA)

## (undated) DEVICE — KIT ANESTHESIA W/CIRCUIT & 3/LT BAG W/FILTER (20EA/CA)

## (undated) DEVICE — SUTURE 2-0 20CM STRATAFIX SPIRAL SH NEEDLE (12/BX)

## (undated) DEVICE — SHEARS MONOPOLAR CURVED  DA VINCI 10X'S REUSABLE

## (undated) DEVICE — BIPOLAR FORCE DA VINCI 12X'S REISABLE

## (undated) DEVICE — ELECTRODE 850 FOAM ADHESIVE - HYDROGEL RADIOTRNSPRNT (50/PK)

## (undated) DEVICE — OBTURATOR BLADELESS STANDARD 8MM (6EA/BX)

## (undated) DEVICE — NEEDLE INSFL 120MM 14GA VRRS - (20/BX)

## (undated) DEVICE — LACTATED RINGERS INJ 1000 ML - (14EA/CA 60CA/PF)

## (undated) DEVICE — MASK ANESTHESIA ADULT  - (100/CA)

## (undated) DEVICE — ROBOTIC SURGERY SERVICES

## (undated) DEVICE — SUTURE GENERAL

## (undated) DEVICE — Device

## (undated) DEVICE — SET EXTENSION WITH 2 PORTS (48EA/CA) ***PART #2C8610 IS A SUBSTITUTE*****

## (undated) DEVICE — TOWEL STOP TIMEOUT SAFETY FLAG (40EA/CA)

## (undated) DEVICE — PROTECTOR ULNA NERVE - (36PR/CA)

## (undated) DEVICE — SEAL 5MM-8MM UNIVERSAL  BOX OF 10

## (undated) DEVICE — GOWN WARMING STANDARD FLEX - (30/CA)

## (undated) DEVICE — CANISTER SUCTION 3000ML MECHANICAL FILTER AUTO SHUTOFF MEDI-VAC NONSTERILE LF DISP  (40EA/CA)

## (undated) DEVICE — ELECTRODE DUAL RETURN W/ CORD - (50/PK)

## (undated) DEVICE — HEAD HOLDER JUNIOR/ADULT

## (undated) DEVICE — CHLORAPREP 26 ML APPLICATOR - ORANGE TINT(25/CA)

## (undated) DEVICE — SUTURE 4-0 MONOCRYL PLUS PS-2 - 27 INCH (36/BX)

## (undated) DEVICE — SET LEADWIRE 5 LEAD BEDSIDE DISPOSABLE ECG (1SET OF 5/EA)

## (undated) DEVICE — DERMABOND ADVANCED - (12EA/BX)

## (undated) DEVICE — COVER TIP ENDOWRIST HOT SHEAR - (10EA/BX) DA VINCI

## (undated) DEVICE — SODIUM CHL IRRIGATION 0.9% 1000ML (12EA/CA)

## (undated) DEVICE — SYSTEM CLEARIFY VISUALIZATION (10EA/PK)

## (undated) DEVICE — SLEEVE, VASO, THIGH, MED

## (undated) DEVICE — TUBING CLEARLINK DUO-VENT - C-FLO (48EA/CA)

## (undated) DEVICE — DRAPE ARM  BOX OF 20

## (undated) DEVICE — TUBE CONNECT SUCTION CLEAR 120 X 1/4" (50EA/CA)"